# Patient Record
Sex: MALE | Race: WHITE | Employment: FULL TIME | URBAN - METROPOLITAN AREA
[De-identification: names, ages, dates, MRNs, and addresses within clinical notes are randomized per-mention and may not be internally consistent; named-entity substitution may affect disease eponyms.]

---

## 2017-04-10 ENCOUNTER — HOSPITAL ENCOUNTER (OUTPATIENT)
Dept: GENERAL RADIOLOGY | Age: 52
Discharge: HOME | End: 2017-04-10
Attending: SURGERY
Payer: SELF-PAY

## 2017-04-10 DIAGNOSIS — R10.10 PAIN OF UPPER ABDOMEN: ICD-10-CM

## 2017-04-10 DIAGNOSIS — K21.9 GASTROESOPHAGEAL REFLUX DISEASE, ESOPHAGITIS PRESENCE NOT SPECIFIED: ICD-10-CM

## 2017-04-10 PROCEDURE — 74241 XR UPPER GI SERIES W KUB: CPT

## 2017-04-10 PROCEDURE — 74011000250 HC RX REV CODE- 250: Performed by: SURGERY

## 2017-04-10 PROCEDURE — 74011000255 HC RX REV CODE- 255: Performed by: SURGERY

## 2017-04-10 RX ADMIN — ANTACID/ANTIFLATULENT 4 G: 380; 550; 10; 10 GRANULE, EFFERVESCENT ORAL at 09:05

## 2017-04-10 RX ADMIN — BARIUM SULFATE 60 ML: 980 POWDER, FOR SUSPENSION ORAL at 08:57

## 2017-04-10 RX ADMIN — BARIUM SULFATE 700 MG: 700 TABLET ORAL at 09:03

## 2017-04-10 RX ADMIN — BARIUM SULFATE 75 ML: 0.6 SUSPENSION ORAL at 08:59

## 2017-04-28 ENCOUNTER — ANESTHESIA EVENT (OUTPATIENT)
Dept: SURGERY | Age: 52
End: 2017-04-28
Payer: SELF-PAY

## 2017-05-05 ENCOUNTER — HOSPITAL ENCOUNTER (OUTPATIENT)
Dept: SURGERY | Age: 52
Discharge: HOME | End: 2017-05-05
Attending: SURGERY
Payer: SELF-PAY

## 2017-05-05 VITALS
BODY MASS INDEX: 42.69 KG/M2 | HEIGHT: 70 IN | RESPIRATION RATE: 18 BRPM | WEIGHT: 298.2 LBS | OXYGEN SATURATION: 98 % | TEMPERATURE: 96.9 F | HEART RATE: 50 BPM | DIASTOLIC BLOOD PRESSURE: 81 MMHG | SYSTOLIC BLOOD PRESSURE: 113 MMHG

## 2017-05-05 LAB
ALBUMIN SERPL BCP-MCNC: 3.9 G/DL (ref 3.5–5)
ALBUMIN/GLOB SERPL: 1.2 {RATIO} (ref 1.2–3.5)
ALP SERPL-CCNC: 71 U/L (ref 50–136)
ALT SERPL-CCNC: 47 U/L (ref 12–65)
ANION GAP BLD CALC-SCNC: 3 MMOL/L (ref 7–16)
AST SERPL W P-5'-P-CCNC: 24 U/L (ref 15–37)
BILIRUB SERPL-MCNC: 0.6 MG/DL (ref 0.2–1.1)
BUN SERPL-MCNC: 12 MG/DL (ref 6–23)
CALCIUM SERPL-MCNC: 9 MG/DL (ref 8.3–10.4)
CHLORIDE SERPL-SCNC: 108 MMOL/L (ref 98–107)
CO2 SERPL-SCNC: 32 MMOL/L (ref 21–32)
CREAT SERPL-MCNC: 0.98 MG/DL (ref 0.8–1.5)
ERYTHROCYTE [DISTWIDTH] IN BLOOD BY AUTOMATED COUNT: 14.5 % (ref 11.9–14.6)
GLOBULIN SER CALC-MCNC: 3.2 G/DL (ref 2.3–3.5)
GLUCOSE SERPL-MCNC: 111 MG/DL (ref 65–100)
HCT VFR BLD AUTO: 43.4 % (ref 41.1–50.3)
HGB BLD-MCNC: 14.5 G/DL (ref 13.6–17.2)
MCH RBC QN AUTO: 29.8 PG (ref 26.1–32.9)
MCHC RBC AUTO-ENTMCNC: 33.4 G/DL (ref 31.4–35)
MCV RBC AUTO: 89.1 FL (ref 79.6–97.8)
PLATELET # BLD AUTO: 183 K/UL (ref 150–450)
PMV BLD AUTO: 9.7 FL (ref 10.8–14.1)
POTASSIUM SERPL-SCNC: 4.5 MMOL/L (ref 3.5–5.1)
PROT SERPL-MCNC: 7.1 G/DL (ref 6.3–8.2)
RBC # BLD AUTO: 4.87 M/UL (ref 4.23–5.67)
SODIUM SERPL-SCNC: 143 MMOL/L (ref 136–145)
WBC # BLD AUTO: 6.8 K/UL (ref 4.3–11.1)

## 2017-05-05 PROCEDURE — 85027 COMPLETE CBC AUTOMATED: CPT | Performed by: SURGERY

## 2017-05-05 PROCEDURE — 80053 COMPREHEN METABOLIC PANEL: CPT | Performed by: SURGERY

## 2017-05-05 RX ORDER — FLUTICASONE PROPIONATE 50 MCG
2 SPRAY, SUSPENSION (ML) NASAL
COMMUNITY

## 2017-05-05 NOTE — PERIOP NOTES
Patient verified name, , and surgery as listed in [facility]. Requested clarification of order for consent: left voice message for Chelo Verde RN at Dr Filippo Olivares office. Type 2 surgery, PAT assessment complete. Labs per surgeon: cbc,cmp; results pending. Labs per anesthesia protocol: no other labs. EKG:  Not done today per anesthesia guidelines. Requested most recent ekg from PCP for reference if needed. Hibiclens and instructions given per hospital policy. Patient provided with handouts including Guide to Surgery, Pain Management, Hand Hygiene, Blood Transfusion Education, and [place] [place] [facility] Brochure. Patient answered medical/surgical history questions at their best of ability. All prior to admission medications documented in [facility]. Original medication prescription bottle not visualized during patient appointment. Patient instructed to hold all vitamins 7 days prior to surgery and NSAIDS 5 days prior to surgery, patient verbalized understanding. Medications to be held : none. Patient instructed to continue previous medications as prescribed prior to surgery and to take the following medications the day of surgery according to anesthesia guidelines with a small sip of water: none. Patient taught back and verbalized understanding.

## 2017-05-05 NOTE — PERIOP NOTES
Received telephone order( with read-back) from Rubia Escobar R.N for Dr Black Patino, surgeon:  Order for surgery consent is \"Laparoscopic Possible Open Removal Of Adjustable Gastric Band and Subcutaneous Port Components\". Discontinue order for [facility]".

## 2017-05-05 NOTE — PERIOP NOTES
Lab results are within limits per anesthesia protocol. Received stress test results dated 9-22-15: within anesthesia limits.

## 2017-05-08 ENCOUNTER — HOSPITAL ENCOUNTER (OUTPATIENT)
Age: 52
Setting detail: OUTPATIENT SURGERY
Discharge: HOME | End: 2017-05-08
Attending: SURGERY | Admitting: SURGERY
Payer: SELF-PAY

## 2017-05-08 ENCOUNTER — ANESTHESIA (OUTPATIENT)
Dept: SURGERY | Age: 52
End: 2017-05-08
Payer: SELF-PAY

## 2017-05-08 VITALS
DIASTOLIC BLOOD PRESSURE: 73 MMHG | HEART RATE: 73 BPM | SYSTOLIC BLOOD PRESSURE: 128 MMHG | BODY MASS INDEX: 42.69 KG/M2 | TEMPERATURE: 98 F | HEIGHT: 70 IN | WEIGHT: 298.19 LBS | RESPIRATION RATE: 18 BRPM | OXYGEN SATURATION: 95 %

## 2017-05-08 PROCEDURE — 77030008477 HC STYL SATN SLP COVD -A: Performed by: ANESTHESIOLOGY

## 2017-05-08 PROCEDURE — 77030000038 HC TIP SCIS LAPSCP SURI -B: Performed by: SURGERY

## 2017-05-08 PROCEDURE — 74011000258 HC RX REV CODE- 258: Performed by: SURGERY

## 2017-05-08 PROCEDURE — 74011250636 HC RX REV CODE- 250/636

## 2017-05-08 PROCEDURE — 76210000006 HC OR PH I REC 0.5 TO 1 HR: Performed by: SURGERY

## 2017-05-08 PROCEDURE — 77030035051: Performed by: SURGERY

## 2017-05-08 PROCEDURE — 74011000250 HC RX REV CODE- 250: Performed by: SURGERY

## 2017-05-08 PROCEDURE — 74011000250 HC RX REV CODE- 250: Performed by: ANESTHESIOLOGY

## 2017-05-08 PROCEDURE — 74011000250 HC RX REV CODE- 250

## 2017-05-08 PROCEDURE — 77030002933 HC SUT MCRYL J&J -A: Performed by: SURGERY

## 2017-05-08 PROCEDURE — 76010000162 HC OR TIME 1.5 TO 2 HR INTENSV-TIER 1: Performed by: SURGERY

## 2017-05-08 PROCEDURE — 77030019940 HC BLNKT HYPOTHRM STRY -B: Performed by: ANESTHESIOLOGY

## 2017-05-08 PROCEDURE — 77030011640 HC PAD GRND REM COVD -A: Performed by: SURGERY

## 2017-05-08 PROCEDURE — 74011250636 HC RX REV CODE- 250/636: Performed by: ANESTHESIOLOGY

## 2017-05-08 PROCEDURE — 77030010507 HC ADH SKN DERMBND J&J -B: Performed by: SURGERY

## 2017-05-08 PROCEDURE — 77030034154 HC SHR COAG HARM ACE J&J -F: Performed by: SURGERY

## 2017-05-08 PROCEDURE — 77030018836 HC SOL IRR NACL ICUM -A: Performed by: SURGERY

## 2017-05-08 PROCEDURE — 74011250636 HC RX REV CODE- 250/636: Performed by: SURGERY

## 2017-05-08 PROCEDURE — 88300 SURGICAL PATH GROSS: CPT | Performed by: SURGERY

## 2017-05-08 PROCEDURE — 77030002912 HC SUT ETHBND J&J -A: Performed by: SURGERY

## 2017-05-08 PROCEDURE — 77030008703 HC TU ET UNCUF COVD -A: Performed by: ANESTHESIOLOGY

## 2017-05-08 PROCEDURE — 76060000034 HC ANESTHESIA 1.5 TO 2 HR: Performed by: SURGERY

## 2017-05-08 PROCEDURE — 77030008518 HC TBNG INSUF ENDO STRY -B: Performed by: SURGERY

## 2017-05-08 PROCEDURE — 76210000020 HC REC RM PH II FIRST 0.5 HR: Performed by: SURGERY

## 2017-05-08 PROCEDURE — 77030033269 HC SLV COMPR SCD KNE2 CARD -B: Performed by: SURGERY

## 2017-05-08 PROCEDURE — 77030031139 HC SUT VCRL2 J&J -A: Performed by: SURGERY

## 2017-05-08 PROCEDURE — 77030034849: Performed by: SURGERY

## 2017-05-08 PROCEDURE — 77030035048 HC TRCR ENDOSC OPTCL COVD -B: Performed by: SURGERY

## 2017-05-08 PROCEDURE — 77030035045 HC TRCR ENDOSC VRSPRT BLDLSS COVD -B: Performed by: SURGERY

## 2017-05-08 RX ORDER — KETOROLAC TROMETHAMINE 30 MG/ML
INJECTION, SOLUTION INTRAMUSCULAR; INTRAVENOUS AS NEEDED
Status: DISCONTINUED | OUTPATIENT
Start: 2017-05-08 | End: 2017-05-08 | Stop reason: HOSPADM

## 2017-05-08 RX ORDER — GLYCOPYRROLATE 0.2 MG/ML
INJECTION INTRAMUSCULAR; INTRAVENOUS AS NEEDED
Status: DISCONTINUED | OUTPATIENT
Start: 2017-05-08 | End: 2017-05-08 | Stop reason: HOSPADM

## 2017-05-08 RX ORDER — LIDOCAINE HYDROCHLORIDE 10 MG/ML
0.1 INJECTION INFILTRATION; PERINEURAL AS NEEDED
Status: DISCONTINUED | OUTPATIENT
Start: 2017-05-08 | End: 2017-05-08 | Stop reason: HOSPADM

## 2017-05-08 RX ORDER — SUCCINYLCHOLINE CHLORIDE 20 MG/ML
INJECTION INTRAMUSCULAR; INTRAVENOUS AS NEEDED
Status: DISCONTINUED | OUTPATIENT
Start: 2017-05-08 | End: 2017-05-08 | Stop reason: HOSPADM

## 2017-05-08 RX ORDER — MIDAZOLAM HYDROCHLORIDE 1 MG/ML
2 INJECTION, SOLUTION INTRAMUSCULAR; INTRAVENOUS ONCE
Status: COMPLETED | OUTPATIENT
Start: 2017-05-08 | End: 2017-05-08

## 2017-05-08 RX ORDER — ONDANSETRON 2 MG/ML
4 INJECTION INTRAMUSCULAR; INTRAVENOUS ONCE
Status: DISCONTINUED | OUTPATIENT
Start: 2017-05-08 | End: 2017-05-08 | Stop reason: HOSPADM

## 2017-05-08 RX ORDER — ONDANSETRON 2 MG/ML
INJECTION INTRAMUSCULAR; INTRAVENOUS AS NEEDED
Status: DISCONTINUED | OUTPATIENT
Start: 2017-05-08 | End: 2017-05-08 | Stop reason: HOSPADM

## 2017-05-08 RX ORDER — ENOXAPARIN SODIUM 100 MG/ML
40 INJECTION SUBCUTANEOUS ONCE
Status: COMPLETED | OUTPATIENT
Start: 2017-05-08 | End: 2017-05-08

## 2017-05-08 RX ORDER — SODIUM CHLORIDE, SODIUM LACTATE, POTASSIUM CHLORIDE, CALCIUM CHLORIDE 600; 310; 30; 20 MG/100ML; MG/100ML; MG/100ML; MG/100ML
75 INJECTION, SOLUTION INTRAVENOUS CONTINUOUS
Status: DISCONTINUED | OUTPATIENT
Start: 2017-05-08 | End: 2017-05-08 | Stop reason: HOSPADM

## 2017-05-08 RX ORDER — NALOXONE HYDROCHLORIDE 0.4 MG/ML
0.2 INJECTION, SOLUTION INTRAMUSCULAR; INTRAVENOUS; SUBCUTANEOUS AS NEEDED
Status: DISCONTINUED | OUTPATIENT
Start: 2017-05-08 | End: 2017-05-08 | Stop reason: HOSPADM

## 2017-05-08 RX ORDER — ROCURONIUM BROMIDE 10 MG/ML
INJECTION, SOLUTION INTRAVENOUS AS NEEDED
Status: DISCONTINUED | OUTPATIENT
Start: 2017-05-08 | End: 2017-05-08 | Stop reason: HOSPADM

## 2017-05-08 RX ORDER — MIDAZOLAM HYDROCHLORIDE 1 MG/ML
2 INJECTION, SOLUTION INTRAMUSCULAR; INTRAVENOUS
Status: DISCONTINUED | OUTPATIENT
Start: 2017-05-08 | End: 2017-05-08 | Stop reason: HOSPADM

## 2017-05-08 RX ORDER — HYDROMORPHONE HYDROCHLORIDE 2 MG/ML
0.5 INJECTION, SOLUTION INTRAMUSCULAR; INTRAVENOUS; SUBCUTANEOUS
Status: DISCONTINUED | OUTPATIENT
Start: 2017-05-08 | End: 2017-05-08 | Stop reason: HOSPADM

## 2017-05-08 RX ORDER — DEXAMETHASONE SODIUM PHOSPHATE 100 MG/10ML
INJECTION INTRAMUSCULAR; INTRAVENOUS AS NEEDED
Status: DISCONTINUED | OUTPATIENT
Start: 2017-05-08 | End: 2017-05-08 | Stop reason: HOSPADM

## 2017-05-08 RX ORDER — OXYCODONE HYDROCHLORIDE 5 MG/1
5 TABLET ORAL
Status: DISCONTINUED | OUTPATIENT
Start: 2017-05-08 | End: 2017-05-08 | Stop reason: HOSPADM

## 2017-05-08 RX ORDER — NEOSTIGMINE METHYLSULFATE 1 MG/ML
INJECTION INTRAVENOUS AS NEEDED
Status: DISCONTINUED | OUTPATIENT
Start: 2017-05-08 | End: 2017-05-08 | Stop reason: HOSPADM

## 2017-05-08 RX ORDER — LIDOCAINE HYDROCHLORIDE 20 MG/ML
INJECTION, SOLUTION EPIDURAL; INFILTRATION; INTRACAUDAL; PERINEURAL AS NEEDED
Status: DISCONTINUED | OUTPATIENT
Start: 2017-05-08 | End: 2017-05-08 | Stop reason: HOSPADM

## 2017-05-08 RX ORDER — PROPOFOL 10 MG/ML
INJECTION, EMULSION INTRAVENOUS AS NEEDED
Status: DISCONTINUED | OUTPATIENT
Start: 2017-05-08 | End: 2017-05-08 | Stop reason: HOSPADM

## 2017-05-08 RX ORDER — FENTANYL CITRATE 50 UG/ML
INJECTION, SOLUTION INTRAMUSCULAR; INTRAVENOUS AS NEEDED
Status: DISCONTINUED | OUTPATIENT
Start: 2017-05-08 | End: 2017-05-08 | Stop reason: HOSPADM

## 2017-05-08 RX ADMIN — GLYCOPYRROLATE 0.4 MG: 0.2 INJECTION INTRAMUSCULAR; INTRAVENOUS at 12:03

## 2017-05-08 RX ADMIN — GENTAMICIN SULFATE 490 MG: 40 INJECTION, SOLUTION INTRAMUSCULAR; INTRAVENOUS at 09:41

## 2017-05-08 RX ADMIN — SODIUM CHLORIDE 900 MG: 900 INJECTION, SOLUTION INTRAVENOUS at 10:42

## 2017-05-08 RX ADMIN — LIDOCAINE HYDROCHLORIDE 80 MG: 20 INJECTION, SOLUTION EPIDURAL; INFILTRATION; INTRACAUDAL; PERINEURAL at 10:46

## 2017-05-08 RX ADMIN — ENOXAPARIN SODIUM 40 MG: 40 INJECTION SUBCUTANEOUS at 09:42

## 2017-05-08 RX ADMIN — NEOSTIGMINE METHYLSULFATE 3 MG: 1 INJECTION INTRAVENOUS at 12:03

## 2017-05-08 RX ADMIN — LIDOCAINE HYDROCHLORIDE 0.1 ML: 10 INJECTION, SOLUTION INFILTRATION; PERINEURAL at 09:38

## 2017-05-08 RX ADMIN — DEXAMETHASONE SODIUM PHOSPHATE 10 MG: 100 INJECTION INTRAMUSCULAR; INTRAVENOUS at 11:01

## 2017-05-08 RX ADMIN — SODIUM CHLORIDE, SODIUM LACTATE, POTASSIUM CHLORIDE, AND CALCIUM CHLORIDE: 600; 310; 30; 20 INJECTION, SOLUTION INTRAVENOUS at 11:21

## 2017-05-08 RX ADMIN — MIDAZOLAM HYDROCHLORIDE 2 MG: 1 INJECTION, SOLUTION INTRAMUSCULAR; INTRAVENOUS at 09:55

## 2017-05-08 RX ADMIN — FENTANYL CITRATE 100 MCG: 50 INJECTION, SOLUTION INTRAMUSCULAR; INTRAVENOUS at 10:46

## 2017-05-08 RX ADMIN — ROCURONIUM BROMIDE 5 MG: 10 INJECTION, SOLUTION INTRAVENOUS at 10:46

## 2017-05-08 RX ADMIN — ROCURONIUM BROMIDE 30 MG: 10 INJECTION, SOLUTION INTRAVENOUS at 10:55

## 2017-05-08 RX ADMIN — SODIUM CHLORIDE 600 MG: 900 INJECTION, SOLUTION INTRAVENOUS at 12:11

## 2017-05-08 RX ADMIN — SODIUM CHLORIDE, SODIUM LACTATE, POTASSIUM CHLORIDE, AND CALCIUM CHLORIDE 75 ML/HR: 600; 310; 30; 20 INJECTION, SOLUTION INTRAVENOUS at 09:38

## 2017-05-08 RX ADMIN — PROPOFOL 200 MG: 10 INJECTION, EMULSION INTRAVENOUS at 10:46

## 2017-05-08 RX ADMIN — ROCURONIUM BROMIDE 5 MG: 10 INJECTION, SOLUTION INTRAVENOUS at 11:53

## 2017-05-08 RX ADMIN — KETOROLAC TROMETHAMINE 30 MG: 30 INJECTION, SOLUTION INTRAMUSCULAR; INTRAVENOUS at 11:56

## 2017-05-08 RX ADMIN — SUCCINYLCHOLINE CHLORIDE 180 MG: 20 INJECTION INTRAMUSCULAR; INTRAVENOUS at 10:46

## 2017-05-08 RX ADMIN — ONDANSETRON 4 MG: 2 INJECTION INTRAMUSCULAR; INTRAVENOUS at 11:56

## 2017-05-08 NOTE — OP NOTES
[facility] 64   OPERATIVE REPORT       Name:  Linda Figueroa   MR#:  [id number]   :  1965   Account #:  [de-identified]   Date of Adm:  2017       PREOPERATIVE DIAGNOSIS: Epigastric pain, chest pain, and   dysphagia after a history of an adjustable gastric band   placement. POSTOPERATIVE DIAGNOSIS: Epigastric pain, chest pain, and   dysphagia after a history of an adjustable gastric band   placement with intraabdominal adhesions found. NAME OF PROCEDURE: Some laparoscopic removal of adjustable   gastric band and lysis of adhesions. SURGEON: Author Kyler MD.     ANESTHESIA: General endotracheal anesthesia with Dr. Gladis Young and   Collette Taylor, the CRNA.    ESTIMATED BLOOD LOSS: 25 mL. SPECIMENS: Gastric band tubing and port were sent for gross   examination to pathology. HISTORY: This is a 78-year-old gentleman who came to my office   for consideration of epigastric pain, chest pain, dysphasia. He   had an adjustable gastric band placed [place] in . Unfortunately, the surgeon who placed the band no longer had   bariatric privileges and could not remove the band. He went to   another program in [place] and was not happy, therefore, he   came to our program. I ordered an upper GI series that showed   delayed emptying through the band, even though there was no   fluid in it. The 12.5 mm tablet did not pass through. There was   significant reflux. The patient has been uncomfortable for   sometime awaiting removal of this band. I recommended excision   of the band. I went through the risks of bleeding, infection,   anesthesia, injury to the esophagus, stomach, small bowel, large   bowel, liver, spleen, any of the surrounding organs in the upper   part of the abdomen. I said we would remove the band, tubing,   and port. He was agreeable and wanted to proceed. The surgery   was scheduled for today.     DESCRIPTION OF PROCEDURE: The patient was brought to room #7 at   [facility], placed on the operating table in   the supine position where general endotracheal anesthesia was   administered without complication. He had a Lagos catheter   placed. This was removed and the packing. Sequential compression   devices were placed and used throughout the procedure. The   abdomen was prepped and draped in the usual sterile manner. The   surgeon and [place] to place the port. An incision site   superior and to the patient's left of the umbilicus and made an   incision through this. Previous incision with a 15 scalpel blade   got down to the port, opened the capsule, and cut the Prolene   sutures that were causing adherence of the port to the   underlying fascia. Once we had done this, we pulled up on the   tubing until we got to the junction where it had been joined to   the previous tubing. I cut the tubing at this point,   hemostated the tubing. Using an Optiview trocar were then able   to enter the peritoneal cavity. The peritoneal cavity was   insufflated to 15 mmHg using carbon dioxide gas after which a 10   mm 30 degree scope was inserted. The patient had incisions,   two 5 mm trocars incisions, one in the epigastric region, one in   the right upper quadrant that had been used by the previous   surgeon in [place]. We placed a 5 mm trocar in the left upper   quadrant through one of the previous incision sites. He had   adhesions between where the 5 mm trocar site was in the anterior   abdominal wall and the omentum. He also had significant   adhesions where the tubing went up into the port. I enlarged the   5 mm trocar in the left upper quadrant to a 12 mm trocar, placed   a camera through this. Using the laparoscopic chiquita and the   Harmonic scalpel, we spent about 20 minutes taking down   adhesions around the port site. Once this was done, we   reinserted the 10 mm 30-degree scope into the Optiview trocar   and took on the band itself.  The band tubing came out on the   greater curvature side of the stomach unlike the way I normally   do it. We were able to cut down significant adhesions   surrounding the band and the tubing and then came upon the   locking mechanism for the Lap-Band system itself. We divided all   the adhesions overlying this, I was able to open the band and   then was able to pull it through its tract. I divided all the   capsule on the anterior 180 degrees of the previous Lap-Band   site. This was done with the 5 mm Harmonic scalpel. The band was   brought up through the Optiview trocar site and sent to   pathology in 3 pieces. There was another segment of tubing and   then there was the lap band itself. This was passed off to   pathology for gross examination only. The Optiview trocar was   turned to the previous supraumbilical incision site. We checked   the area where the band had been. There was no significant   bleeding. We removed the trocars under direct vision. There was   bleeding from 2 of the trocars which were cauterized with the   hook and the electrocautery. The other 2 trocars had no evidence   of bleeding. Once There was no bleeding noted from the trocar   sites. The Optiview trocar was removed. I closed the fascia of   the old port site with a running 0 Vicryl suture. The port sites   were irrigated. Subcutaneous tissues were free of any bleeding   and we closed with a subcuticular suture of 4-0 Monocryl. I   injected 30 mL of 0.5% Marcaine in divided doses at the 5   incision sites. The patient tolerated the procedure well, was   extubated, brought to recovery room in stable condition.         Fahad Carlson MD      [facility] / [facility] Landeros   D:  05/08/2017   12:19   T:  05/08/2017   14:59   Job #:  [id number]

## 2017-05-08 NOTE — INTERVAL H&P NOTE
H&P Update:  Woo Quintero was seen and examined. History and physical has been reviewed. The patient has been examined.  There have been no significant clinical changes since the completion of the originally dated History and Physical.    Signed By: Janie Delgadillo MD     May 8, 2017 8:45 AM

## 2017-05-08 NOTE — H&P (VIEW-ONLY)
History and Physical    Patient: Liyah No MRN: [id number]  SSN: [id number]    YOB: 1965  Age: 47 y.o. Sex: male              PCP: None   Date:  4/24/2017        Liyah No  who presents to the [facility] for consultation for removal of his adjustable gastric band. Mr. Sincere Quintero had a LapBand placed 8/18/2009 by Dr. Angel Castillo in [place], [place]. He returned to Dr. Anton Mittal ([facility]) on 3/20/15 with complaints of epigastric pain and dysphagia with liquids and solids. At that visit, Dr. Anton Mittal removed all of the fluid from his band and ordered an UGI series. The patient returned on 3/25/15 for the results and reported that his epigastric pain was still present but had improved. The UGI at that time showed the band in good position and that liquids passed through it, however the barium tablet did not. He and Dr. Anton Mittal discussed removing his band at that time, however Dr. Anton Mittal no longer had surgical privileges for bariatrics and referred the patient to another program for AGB removal. The patient was unsatisfied with that particular program and consequently did not proceed with the surgery. He contacted our program recently to request a consult to discuss the removal of his band. Today he states that he has been having significant epigastric pain that radiates into his \"left rib cage\" and dysphagia. Pt states that this symptoms intensified in January and he went for an EKG to rule out cardiac problems. EKG was negative and he realized that it must be his AGB. Prior to coming in today, pt had a UGI series on 3/21/17 which showed as follows: FINDINGS: The patient aspirated a small amount of barium at least once. There  is no fold thickening or ulceration in the esophagus. There is mild diffuse  spasm.     There is a lap band in place at the GE junction. There is normal luminal  narrowing. The esophagus above the lap band is not significantly distended.  No  fold thickening is seen in the stomach. There is no ulceration. There is  prompt emptying into the small bowel. The patient was given a barium tablet  which did become lodged at the lap band.     IMPRESSION  IMPRESSION:   1. Small amount of aspiration. 2. Mild esophageal spasm. 3. Lap band in appropriate position. Today he presents with a height of 5' 10\" (1.778 m) and weight of 297 lb (134.7 kg), giving him a Body mass index is 42.62 kg/(m^2). Milvia Post We have reviewed the procedure today. His questions were answered and he is ready to schedule surgery. We have discussed the procedure, diet and exercise regimens, and potential complications of surgery. The patient understands the nature and potential complication of surgery and has the capacity to follow the post operative diet, exercise, and nutritional requirements. HISTORY:  Past Medical History:   Diagnosis Date    Calculus of kidney     Contact dermatitis and other eczema, due to unspecified cause     Morbid obesity ([address]     Musculoskeletal disorder        He denies personal or family history of problems with anesthesia, bleeding, or clotting. He denies history of DVT or pulmonary embolism. He denies reflux or dysphagia. Past Surgical History:   Procedure Laterality Date    HX APPENDECTOMY      HX COLONOSCOPY  2013    HX HEENT      uvulectomt,t&A    HX ORTHOPAEDIC      neck , spinal fusion,rt knee artho    HX OTHER SURGICAL      lap band     Social History   Substance Use Topics    Smoking status: Never Smoker    Smokeless tobacco: Not on file    Alcohol use Yes     No family history on file. MEDICATIONS:  No current outpatient prescriptions on file. No current facility-administered medications for this visit. ALLERGIES:  Allergies   Allergen Reactions    Penicillins Rash     Doesn't exactly remember, had it as a child       ROS: The patient has no difficulty with chest pain or shortness of breath.   No fever or chills. Comprehensive 13 point review of systems was otherwise unremarkable except as noted above. PHYSICAL EXAM:   Visit Vitals    BP (!) 142/94    Pulse (!) 59    Ht 5' 10\" (1.778 m)    Wt 297 lb (134.7 kg)    BMI 42.62 kg/m2       General: Alert oriented cooperative patient in no acute distress. Eyes: Sclera are clear. Conjunctiva and lids within normal limits. No icterus. Ears and Nose: no gross deformities to visual inspection, gross hearing intact  Neck: Supple, trachea midline, no appreciable thyromegaly  Resp: No JVD. Breathing is  non-labored. Lungs clear to auscultation without wheezing or rhonchi   CV: RRR. No murmurs, rubs or gallops appreciated, Carotid bruits are absent  Abd: soft non-tender and non-distended without peritoneal signs. +bs    Psych:  Mood and affect appropriate. Short-term memory and understanding intact      ASSESSMENT:  Morbid obesity with a  Body mass index is 42.62 kg/(m^2). ready for lap/open removal of his adjustable gastric band. PLAN:  1.  Schedule for laparoscopic, possible open, AGB removal, in the near future. 2. Follow up after surgery. I went over the risks and benefits with the patient. For risks I went over problems with bleeding, infection and anesthesia. I also noted potential problems of injury to the esophagus, liver, spleen, stomach, pancreas, small bowel and or colon. I addition, I discussed potential problems of DVT/pulmonary embolism, urinary tract infection, wound infection, myocardial infarction, stroke and the potential need to convert to an open procedure. I quoted a 1% nationwide mortality rate for this procedure.     Mariam Gilmore MD    Date:  4/24/2017

## 2017-05-08 NOTE — PERIOP NOTES
Patient's O2 sat 83% after Versed 2mg given IV. O2 per nasal cannula applied to patient.   O2sat 100%

## 2017-05-08 NOTE — PERIOP NOTES
Discharge instructions reviewed with patient and family  Hard copy signed and placed on the chart    Verbalized understanding  And prescription given

## 2017-05-08 NOTE — DISCHARGE INSTRUCTIONS
1. Diet as tolerated except for a  low fat diet after laparoscopic cholecystectomy. 2. Showering is allowed, but no tub baths, hot tubs or swimming. 3. Drainage is common from the wounds. Change the dressings as needed. Call our office if the wounds become reddened, tender, feel warm to the touch or pus starts to drain from the wound. 4. Take prescribed pain medication as directed, usually Percocet, Norco, Ultram or Dilaudid. Take over the counter medication for minor pain. 5. Ice may be applied intermittently to the surgical site or sites. 6. Call or office, [phone], if problems arise. 7. Follow up in the office at the assigned time. 8. Resume all medications as taken per surgery, unless specifically instructed not to take certain ones. 9. No lifting more than 25 pounds until told otherwise. 10. Driving is allowed 3 days after surgery as long as you feel comfortable enough to drive and have not taken any prescription pain medication prior to driving.

## 2017-05-08 NOTE — PERIOP NOTES
Patient's nephew Colin Elizabeth to  patient at discharge. Call when patient gets to recovery room [phone]    Patient's O2 sat 83% after Versed 2mg given IV. O2 per nasal cannula applied to patient at 3liters per nsal cannula.

## 2017-05-08 NOTE — BRIEF OP NOTE
BRIEF OPERATIVE NOTE    Date of Procedure: 5/8/2017   Preoperative Diagnosis: EPIGASTRIC PAIN, CHEST PAIN AND DYSPHAGIA AFTER ADJUSTABLE GASTRIC BAND PLACEMENT  Postoperative Diagnosis: SAME AS WELL AS INTRAABDOMINAL ADHESIONS.    Procedure(s): LAPAROSCOPIC GASTRIC BAND REMOVAL AND LYSIS OF ADHESIONS  Surgeon(s) and Role:     * Vitaly Galindo MD - Primary         Assistant Staff:       Surgical Staff:  Circ-1: Jarett Lemon RN  Circ-2: Richard Alvarado RN  Scrub Tech-1: Ayad Powell  Scrub Tech-2: Ophelia Snow Ma  Scrub Tech-Relief: Noemy Menezes  Event Time In   Incision Start 1103   Incision Close 1202     Anesthesia: General plus local  Estimated Blood Loss: 25 cc's  Specimens:   ID Type Source Tests Collected by Time Destination   1 : Gastric Lap Band Fresh Abdomen  Vitaly Galindo MD 5/8/2017 1140 Pathology      Findings: See dictated note  Complications: None  Implants: * No implants in log *

## 2017-05-08 NOTE — IP AVS SNAPSHOT
Jessica Dilanvivian 
 
 
 [address] 
[phone] Patient: Willaim Nissen MRN: [id number] :1965 You are allergic to the following Allergen Reactions Penicillins Rash Doesn't exactly remember, had it as a child Recent Documentation Height Weight BMI Smoking Status 1.778 m 135.3 kg 42.79 kg/m2 Never Smoker Emergency Contacts Name Discharge Info Relation Home Work Mobile Scott Hancock DISCHARGE CAREGIVER [3] Sister [23]   [phone] About your hospitalization You were admitted on: May 8, 2017 You last received care in the:  [facility] PACU You were discharged on: May 8, 2017 Unit phone number:  [phone] Why you were hospitalized Your primary diagnosis was:  Not on File Providers Seen During Your Hospitalizations Provider Role Specialty Primary office phone Norm Millan MD Attending Provider General Surgery [phone] Your Primary Care Physician (PCP) Primary Care Physician Office Phone Office Fax Andree Thomson 29[address], [address] [facility] [phone] Follow-up Information Follow up With Details Comments Contact Info Gogo Manuel MD   [address] [place] [id number] [phone] Norm Millan MD Schedule an appointment as soon as possible for a visit in 2 week(s)  [address] [place] [address] [id number] [phone] Your Appointments Monday May 22, 2017  1:40 PM EDT  
GPO BAR with Norm Millan MD  
[place] [facility] ([address] [place] [address]) [address] [place] [phone] Current Discharge Medication List  
  
CONTINUE these medications which have NOT CHANGED Dose & Instructions Dispensing Information Comments Morning Noon Evening Bedtime FLONASE 50 mcg/actuation nasal spray Generic drug:  fluticasone Your last dose was: Your next dose is:    
   
   
 Dose:  2 Spray 2 Sprays by Both Nostrils route nightly. Indications: ALLERGIC RHINITIS Refills:  0 Discharge Instructions 1. Diet as tolerated except for a  low fat diet after laparoscopic cholecystectomy. 2. Showering is allowed, but no tub baths, hot tubs or swimming. 3. Drainage is common from the wounds. Change the dressings as needed. Call our office if the wounds become reddened, tender, feel warm to the touch or pus starts to drain from the wound. 4. Take prescribed pain medication as directed, usually Percocet, Norco, Ultram or Dilaudid. Take over the counter medication for minor pain. 5. Ice may be applied intermittently to the surgical site or sites. 6. Call or office, [phone], if problems arise. 7. Follow up in the office at the assigned time. 8. Resume all medications as taken per surgery, unless specifically instructed not to take certain ones. 9. No lifting more than 25 pounds until told otherwise. 10. Driving is allowed 3 days after surgery as long as you feel comfortable enough to drive and have not taken any prescription pain medication prior to driving. Discharge Orders None Introducing [facility]! Raffaele Carrillo introduces [employer] patient portal. Now you can access parts of your medical record, email your doctor's office, and request medication refills online. 1. In your internet browser, go to [email or web address] 2. Click on the First Time User? Click Here link in the Sign In box. You will see the New Member Sign Up page. 3. Enter your [employer] Access Code exactly as it appears below. You will not need to use this code after youve completed the sign-up process. If you do not sign up before the expiration date, you must request a new code. · [employer] Access Code: [email or web address] Expires: 6/19/2017  5:15 PM 
 
4. Enter the last four digits of your Social Security Number (xxxx) and Date of Birth (mm/dd/yyyy) as indicated and click Submit. You will be taken to the next sign-up page. 5. Create a [employer] ID. This will be your [employer] login ID and cannot be changed, so think of one that is secure and easy to remember. 6. Create a [employer] password. You can change your password at any time. 7. Enter your Password Reset Question and Answer. This can be used at a later time if you forget your password. 8. Enter your e-mail address. You will receive e-mail notification when new information is available in [address] 9. Click Sign Up. You can now view and download portions of your medical record. 10. Click the Download Summary menu link to download a portable copy of your medical information. If you have questions, please visit the Frequently Asked Questions section of the [employer] website. Remember, [employer] is NOT to be used for urgent needs. For medical emergencies, dial 911. Now available from your iPhone and Android! General Information Please provide this summary of care documentation to your next provider. Patient Signature:  ____________________________________________________________ Date:  ____________________________________________________________  
  
Karli Spears Provider Signature:  ____________________________________________________________ Date:  ____________________________________________________________

## 2017-05-08 NOTE — ANESTHESIA POSTPROCEDURE EVALUATION
Post-Anesthesia Evaluation and Assessment    Patient: Woo Quintero MRN: [id number]  SSN: [id number]    YOB: 1965  Age: 47 y.o. Sex: male       Cardiovascular Function/Vital Signs  Visit Vitals    /86 (BP 1 Location: Right arm, BP Patient Position: At rest;Head of bed elevated (Comment degrees))    Pulse 90    Temp 36.7 °C (98 °F)    Resp 16    Ht 5' 10\" (1.778 m)    Wt 135.3 kg (298 lb 3 oz)    SpO2 96%    BMI 42.79 kg/m2       Patient is status post general anesthesia for Procedure(s):  GASTRIC BANDING REMOVAL LAPAROSCOPIC. Nausea/Vomiting: None    Postoperative hydration reviewed and adequate. Pain:  Pain Scale 1: Numeric (0 - 10) (05/08/17 1227)  Pain Intensity 1: 0 (05/08/17 1227)   Managed    Neurological Status:   Neuro (WDL): Exceptions to WDL (05/08/17 1217)  Neuro  Neurologic State: Drowsy; Eyes open to voice (05/08/17 1217)  Cognition: Decreased command following (05/08/17 1217)  Speech: Clear (05/08/17 1217)  LUE Motor Response: Purposeful (05/08/17 1217)  LLE Motor Response: Purposeful (05/08/17 1217)  RUE Motor Response: Purposeful (05/08/17 1217)  RLE Motor Response: Purposeful (05/08/17 1217)   At baseline    Mental Status and Level of Consciousness: Arousable    Pulmonary Status:   O2 Device: Nasal cannula (05/08/17 1227)   Adequate oxygenation and airway patent    Complications related to anesthesia: None    Post-anesthesia assessment completed. No concerns. Pt doing well.      Signed By: Camron Moreno MD     May 8, 2017

## 2017-05-11 ENCOUNTER — ANESTHESIA EVENT (OUTPATIENT)
Dept: SURGERY | Age: 52
DRG: 329 | End: 2017-05-11
Payer: SELF-PAY

## 2017-05-11 ENCOUNTER — APPOINTMENT (OUTPATIENT)
Dept: GENERAL RADIOLOGY | Age: 52
DRG: 329 | End: 2017-05-11
Attending: INTERNAL MEDICINE
Payer: SELF-PAY

## 2017-05-11 ENCOUNTER — HOSPITAL ENCOUNTER (INPATIENT)
Age: 52
LOS: 4 days | Discharge: HOME | DRG: 329 | End: 2017-05-15
Attending: SURGERY | Admitting: SURGERY
Payer: SELF-PAY

## 2017-05-11 ENCOUNTER — ANESTHESIA (OUTPATIENT)
Dept: SURGERY | Age: 52
DRG: 329 | End: 2017-05-11
Payer: SELF-PAY

## 2017-05-11 DIAGNOSIS — K56.60 INTESTINAL OBSTRUCTION, UNSPECIFIED TYPE: ICD-10-CM

## 2017-05-11 DIAGNOSIS — Z99.89 OSA ON CPAP: ICD-10-CM

## 2017-05-11 DIAGNOSIS — R10.84 GENERALIZED ABDOMINAL PAIN: ICD-10-CM

## 2017-05-11 DIAGNOSIS — G47.33 OSA ON CPAP: ICD-10-CM

## 2017-05-11 DIAGNOSIS — Z99.11 ENCOUNTER FOR WEANING FROM VENTILATOR (HCC): ICD-10-CM

## 2017-05-11 DIAGNOSIS — E66.01 MORBID OBESITY DUE TO EXCESS CALORIES (HCC): ICD-10-CM

## 2017-05-11 DIAGNOSIS — L25.9 CONTACT DERMATITIS AND OTHER ECZEMA, DUE TO UNSPECIFIED CAUSE: ICD-10-CM

## 2017-05-11 DIAGNOSIS — K56.609 SBO (SMALL BOWEL OBSTRUCTION) (HCC): Primary | ICD-10-CM

## 2017-05-11 DIAGNOSIS — K63.1 SMALL BOWEL PERFORATION (HCC): ICD-10-CM

## 2017-05-11 DIAGNOSIS — R11.15 INTRACTABLE CYCLICAL VOMITING WITH NAUSEA: ICD-10-CM

## 2017-05-11 LAB
ALBUMIN SERPL BCP-MCNC: 2.9 G/DL (ref 3.5–5)
ALBUMIN/GLOB SERPL: 0.6 {RATIO} (ref 1.2–3.5)
ALP SERPL-CCNC: 56 U/L (ref 50–136)
ALT SERPL-CCNC: 35 U/L (ref 12–65)
ANION GAP BLD CALC-SCNC: 10 MMOL/L (ref 7–16)
ARTERIAL PATENCY WRIST A: ABNORMAL
ARTERIAL PATENCY WRIST A: POSITIVE
AST SERPL W P-5'-P-CCNC: 19 U/L (ref 15–37)
BACTERIA SPEC CULT: NORMAL
BASE EXCESS BLDA CALC-SCNC: 0.4 MMOL/L (ref 0–3)
BASE EXCESS BLDA CALC-SCNC: 2.6 MMOL/L (ref 0–3)
BDY SITE: ABNORMAL
BDY SITE: ABNORMAL
BILIRUB SERPL-MCNC: 0.8 MG/DL (ref 0.2–1.1)
BUN SERPL-MCNC: 26 MG/DL (ref 6–23)
CA-I BLD-SCNC: 1.18 MMOL/L (ref 1–1.3)
CALCIUM SERPL-MCNC: 9.4 MG/DL (ref 8.3–10.4)
CHLORIDE BLDA-SCNC: 99 MMOL/L (ref 98–106)
CHLORIDE SERPL-SCNC: 101 MMOL/L (ref 98–107)
CO2 SERPL-SCNC: 27 MMOL/L (ref 21–32)
COHGB MFR BLD: 0.9 % (ref 0.5–1.5)
COHGB MFR BLD: 0.9 % (ref 0.5–1.5)
CREAT SERPL-MCNC: 1.13 MG/DL (ref 0.8–1.5)
DO-HGB BLD-MCNC: 4 % (ref 0–5)
DO-HGB BLD-MCNC: 9 % (ref 0–5)
ERYTHROCYTE [DISTWIDTH] IN BLOOD BY AUTOMATED COUNT: 14.3 % (ref 11.9–14.6)
FIO2 ON VENT: 21 %
GLOBULIN SER CALC-MCNC: 4.5 G/DL (ref 2.3–3.5)
GLUCOSE SERPL-MCNC: 166 MG/DL (ref 65–100)
HCO3 BLDA-SCNC: 26 MMOL/L (ref 22–26)
HCO3 BLDA-SCNC: 26 MMOL/L (ref 22–26)
HCT VFR BLD AUTO: 45.5 % (ref 41.1–50.3)
HGB BLD-MCNC: 15.8 G/DL (ref 13.6–17.2)
HGB BLDMV-MCNC: 15.3 GM/DL (ref 11.7–15)
HGB BLDMV-MCNC: 16.9 GM/DL (ref 11.7–15)
LACTATE SERPL-SCNC: 1.5 MMOL/L (ref 0.4–2)
MCH RBC QN AUTO: 29.9 PG (ref 26.1–32.9)
MCHC RBC AUTO-ENTMCNC: 34.7 G/DL (ref 31.4–35)
MCV RBC AUTO: 86.2 FL (ref 79.6–97.8)
METHGB MFR BLD: 0.7 % (ref 0–1.5)
METHGB MFR BLD: 0.9 % (ref 0–1.5)
OXYHGB MFR BLDA: 89.7 % (ref 94–97)
OXYHGB MFR BLDA: 94.3 % (ref 94–97)
PCO2 BLDA: 35 MMHG (ref 35–45)
PCO2 BLDA: 43 MMHG (ref 35–45)
PEEP RESPIRATORY: 8 CM[H2O]
PH BLDA: 7.39 [PH] (ref 7.35–7.45)
PH BLDA: 7.48 [PH] (ref 7.35–7.45)
PLATELET # BLD AUTO: 214 K/UL (ref 150–450)
PMV BLD AUTO: 9.5 FL (ref 10.8–14.1)
PO2 BLDA: 59 MMHG (ref 75–100)
PO2 BLDA: 81 MMHG (ref 75–100)
POTASSIUM BLDA-SCNC: 4.16 MMOL/L (ref 3.5–5.3)
POTASSIUM SERPL-SCNC: 4.1 MMOL/L (ref 3.5–5.1)
PROT SERPL-MCNC: 7.4 G/DL (ref 6.3–8.2)
RBC # BLD AUTO: 5.28 M/UL (ref 4.23–5.67)
SAO2 % BLD: 91 % (ref 92–98.5)
SAO2 % BLD: 96 % (ref 92–98.5)
SERVICE CMNT-IMP: ABNORMAL
SERVICE CMNT-IMP: NORMAL
SODIUM BLDA-SCNC: 133.2 MMOL/L (ref 135–148)
SODIUM SERPL-SCNC: 138 MMOL/L (ref 136–145)
VENTILATION MODE VENT: ABNORMAL
VENTILATION MODE VENT: ABNORMAL
WBC # BLD AUTO: 5.9 K/UL (ref 4.3–11.1)

## 2017-05-11 PROCEDURE — 77030031139 HC SUT VCRL2 J&J -A: Performed by: SURGERY

## 2017-05-11 PROCEDURE — 77030008771 HC TU NG SALEM SUMP -A: Performed by: SURGERY

## 2017-05-11 PROCEDURE — 77030020407 HC IV BLD WRMR ST 3M -A: Performed by: ANESTHESIOLOGY

## 2017-05-11 PROCEDURE — C9113 INJ PANTOPRAZOLE SODIUM, VIA: HCPCS | Performed by: SURGERY

## 2017-05-11 PROCEDURE — 76060000034 HC ANESTHESIA 1.5 TO 2 HR: Performed by: SURGERY

## 2017-05-11 PROCEDURE — 77030002996 HC SUT SLK J&J -A: Performed by: SURGERY

## 2017-05-11 PROCEDURE — 74011000250 HC RX REV CODE- 250: Performed by: SURGERY

## 2017-05-11 PROCEDURE — 88307 TISSUE EXAM BY PATHOLOGIST: CPT | Performed by: SURGERY

## 2017-05-11 PROCEDURE — 5A1945Z RESPIRATORY VENTILATION, 24-96 CONSECUTIVE HOURS: ICD-10-PCS | Performed by: SURGERY

## 2017-05-11 PROCEDURE — 74011250636 HC RX REV CODE- 250/636

## 2017-05-11 PROCEDURE — 74011000250 HC RX REV CODE- 250

## 2017-05-11 PROCEDURE — 74011250636 HC RX REV CODE- 250/636: Performed by: SURGERY

## 2017-05-11 PROCEDURE — 0WJG0ZZ INSPECTION OF PERITONEAL CAVITY, OPEN APPROACH: ICD-10-PCS | Performed by: SURGERY

## 2017-05-11 PROCEDURE — 77030008771 HC TU NG SALEM SUMP -A: Performed by: ANESTHESIOLOGY

## 2017-05-11 PROCEDURE — 77030008467 HC STPLR SKN COVD -B: Performed by: SURGERY

## 2017-05-11 PROCEDURE — 77030011264 HC ELECTRD BLD EXT COVD -A: Performed by: SURGERY

## 2017-05-11 PROCEDURE — 77030002966 HC SUT PDS J&J -A: Performed by: SURGERY

## 2017-05-11 PROCEDURE — 82803 BLOOD GASES ANY COMBINATION: CPT

## 2017-05-11 PROCEDURE — 87040 BLOOD CULTURE FOR BACTERIA: CPT | Performed by: SURGERY

## 2017-05-11 PROCEDURE — 0DNS0ZZ RELEASE GREATER OMENTUM, OPEN APPROACH: ICD-10-PCS | Performed by: SURGERY

## 2017-05-11 PROCEDURE — 71010 XR CHEST PORT: CPT

## 2017-05-11 PROCEDURE — 77030019908 HC STETH ESOPH SIMS -A: Performed by: ANESTHESIOLOGY

## 2017-05-11 PROCEDURE — 77030008703 HC TU ET UNCUF COVD -A: Performed by: ANESTHESIOLOGY

## 2017-05-11 PROCEDURE — 0D9770Z DRAINAGE OF STOMACH, PYLORUS WITH DRAINAGE DEVICE, VIA NATURAL OR ARTIFICIAL OPENING: ICD-10-PCS | Performed by: SURGERY

## 2017-05-11 PROCEDURE — 77030019940 HC BLNKT HYPOTHRM STRY -B: Performed by: ANESTHESIOLOGY

## 2017-05-11 PROCEDURE — 36415 COLL VENOUS BLD VENIPUNCTURE: CPT | Performed by: SURGERY

## 2017-05-11 PROCEDURE — 77030034850

## 2017-05-11 PROCEDURE — 80053 COMPREHEN METABOLIC PANEL: CPT | Performed by: SURGERY

## 2017-05-11 PROCEDURE — 77030011640 HC PAD GRND REM COVD -A: Performed by: SURGERY

## 2017-05-11 PROCEDURE — 65610000001 HC ROOM ICU GENERAL

## 2017-05-11 PROCEDURE — 0DQ80ZZ REPAIR SMALL INTESTINE, OPEN APPROACH: ICD-10-PCS | Performed by: SURGERY

## 2017-05-11 PROCEDURE — 77030025240 HC RELD STPL GIA 2 COVD -C: Performed by: SURGERY

## 2017-05-11 PROCEDURE — 0WJP0ZZ INSPECTION OF GASTROINTESTINAL TRACT, OPEN APPROACH: ICD-10-PCS | Performed by: SURGERY

## 2017-05-11 PROCEDURE — 77030018836 HC SOL IRR NACL ICUM -A: Performed by: SURGERY

## 2017-05-11 PROCEDURE — 0DB80ZZ EXCISION OF SMALL INTESTINE, OPEN APPROACH: ICD-10-PCS | Performed by: SURGERY

## 2017-05-11 PROCEDURE — 74011000258 HC RX REV CODE- 258: Performed by: SURGERY

## 2017-05-11 PROCEDURE — 87641 MR-STAPH DNA AMP PROBE: CPT | Performed by: SURGERY

## 2017-05-11 PROCEDURE — 77030008771 HC TU NG SALEM SUMP -A

## 2017-05-11 PROCEDURE — 0DN80ZZ RELEASE SMALL INTESTINE, OPEN APPROACH: ICD-10-PCS | Performed by: SURGERY

## 2017-05-11 PROCEDURE — 77030008477 HC STYL SATN SLP COVD -A: Performed by: ANESTHESIOLOGY

## 2017-05-11 PROCEDURE — 85027 COMPLETE CBC AUTOMATED: CPT | Performed by: SURGERY

## 2017-05-11 PROCEDURE — 77030032490 HC SLV COMPR SCD KNE COVD -B

## 2017-05-11 PROCEDURE — 77030019927 HC TBNG IRR CYSTO BAXT -A: Performed by: SURGERY

## 2017-05-11 PROCEDURE — 83605 ASSAY OF LACTIC ACID: CPT | Performed by: SURGERY

## 2017-05-11 PROCEDURE — 99254 IP/OBS CNSLTJ NEW/EST MOD 60: CPT | Performed by: INTERNAL MEDICINE

## 2017-05-11 PROCEDURE — 76010000162 HC OR TIME 1.5 TO 2 HR INTENSV-TIER 1: Performed by: SURGERY

## 2017-05-11 PROCEDURE — 74011250636 HC RX REV CODE- 250/636: Performed by: INTERNAL MEDICINE

## 2017-05-11 PROCEDURE — 36600 WITHDRAWAL OF ARTERIAL BLOOD: CPT

## 2017-05-11 PROCEDURE — 77030019605

## 2017-05-11 PROCEDURE — 77030011278 HC ELECTRD LIG IMPT COVD -F: Performed by: SURGERY

## 2017-05-11 PROCEDURE — 77030034818 HC STPLR INT GIA COVD -C: Performed by: SURGERY

## 2017-05-11 RX ORDER — SODIUM CHLORIDE 0.9 % (FLUSH) 0.9 %
5-10 SYRINGE (ML) INJECTION EVERY 8 HOURS
Status: DISCONTINUED | OUTPATIENT
Start: 2017-05-11 | End: 2017-05-15 | Stop reason: HOSPADM

## 2017-05-11 RX ORDER — HYDROMORPHONE HYDROCHLORIDE 2 MG/ML
INJECTION, SOLUTION INTRAMUSCULAR; INTRAVENOUS; SUBCUTANEOUS AS NEEDED
Status: DISCONTINUED | OUTPATIENT
Start: 2017-05-11 | End: 2017-05-11 | Stop reason: HOSPADM

## 2017-05-11 RX ORDER — ENOXAPARIN SODIUM 100 MG/ML
40 INJECTION SUBCUTANEOUS EVERY 12 HOURS
Status: DISCONTINUED | OUTPATIENT
Start: 2017-05-12 | End: 2017-05-15 | Stop reason: HOSPADM

## 2017-05-11 RX ORDER — FENTANYL CITRATE 50 UG/ML
INJECTION, SOLUTION INTRAMUSCULAR; INTRAVENOUS AS NEEDED
Status: DISCONTINUED | OUTPATIENT
Start: 2017-05-11 | End: 2017-05-11 | Stop reason: HOSPADM

## 2017-05-11 RX ORDER — ONDANSETRON 2 MG/ML
4 INJECTION INTRAMUSCULAR; INTRAVENOUS
Status: DISCONTINUED | OUTPATIENT
Start: 2017-05-11 | End: 2017-05-15 | Stop reason: HOSPADM

## 2017-05-11 RX ORDER — DEXAMETHASONE SODIUM PHOSPHATE 4 MG/ML
INJECTION, SOLUTION INTRA-ARTICULAR; INTRALESIONAL; INTRAMUSCULAR; INTRAVENOUS; SOFT TISSUE AS NEEDED
Status: DISCONTINUED | OUTPATIENT
Start: 2017-05-11 | End: 2017-05-11 | Stop reason: HOSPADM

## 2017-05-11 RX ORDER — ENALAPRILAT 1.25 MG/ML
1.25 INJECTION INTRAVENOUS
Status: DISCONTINUED | OUTPATIENT
Start: 2017-05-11 | End: 2017-05-11 | Stop reason: SDUPTHER

## 2017-05-11 RX ORDER — SODIUM CHLORIDE 0.9 % (FLUSH) 0.9 %
5-10 SYRINGE (ML) INJECTION AS NEEDED
Status: DISCONTINUED | OUTPATIENT
Start: 2017-05-11 | End: 2017-05-15 | Stop reason: HOSPADM

## 2017-05-11 RX ORDER — DEXTROSE 40 %
1 GEL (GRAM) ORAL AS NEEDED
Status: DISCONTINUED | OUTPATIENT
Start: 2017-05-11 | End: 2017-05-15 | Stop reason: HOSPADM

## 2017-05-11 RX ORDER — VECURONIUM BROMIDE FOR INJECTION 1 MG/ML
INJECTION, POWDER, LYOPHILIZED, FOR SOLUTION INTRAVENOUS AS NEEDED
Status: DISCONTINUED | OUTPATIENT
Start: 2017-05-11 | End: 2017-05-11 | Stop reason: HOSPADM

## 2017-05-11 RX ORDER — ROCURONIUM BROMIDE 10 MG/ML
INJECTION, SOLUTION INTRAVENOUS AS NEEDED
Status: DISCONTINUED | OUTPATIENT
Start: 2017-05-11 | End: 2017-05-11 | Stop reason: HOSPADM

## 2017-05-11 RX ORDER — DEXTROSE 50 % IN WATER (D50W) INTRAVENOUS SYRINGE
25-50 AS NEEDED
Status: DISCONTINUED | OUTPATIENT
Start: 2017-05-11 | End: 2017-05-15 | Stop reason: HOSPADM

## 2017-05-11 RX ORDER — MIDAZOLAM HYDROCHLORIDE 1 MG/ML
INJECTION, SOLUTION INTRAMUSCULAR; INTRAVENOUS AS NEEDED
Status: DISCONTINUED | OUTPATIENT
Start: 2017-05-11 | End: 2017-05-11 | Stop reason: HOSPADM

## 2017-05-11 RX ORDER — METOPROLOL TARTRATE 5 MG/5ML
1.25 INJECTION INTRAVENOUS
Status: DISCONTINUED | OUTPATIENT
Start: 2017-05-11 | End: 2017-05-11

## 2017-05-11 RX ORDER — SUCCINYLCHOLINE CHLORIDE 20 MG/ML
INJECTION INTRAMUSCULAR; INTRAVENOUS AS NEEDED
Status: DISCONTINUED | OUTPATIENT
Start: 2017-05-11 | End: 2017-05-11 | Stop reason: HOSPADM

## 2017-05-11 RX ORDER — ACETAMINOPHEN 10 MG/ML
1000 INJECTION, SOLUTION INTRAVENOUS EVERY 6 HOURS
Status: COMPLETED | OUTPATIENT
Start: 2017-05-11 | End: 2017-05-12

## 2017-05-11 RX ORDER — NALOXONE HYDROCHLORIDE 0.4 MG/ML
0.4 INJECTION, SOLUTION INTRAMUSCULAR; INTRAVENOUS; SUBCUTANEOUS AS NEEDED
Status: DISCONTINUED | OUTPATIENT
Start: 2017-05-11 | End: 2017-05-15 | Stop reason: HOSPADM

## 2017-05-11 RX ORDER — PROPOFOL 10 MG/ML
5-50 VIAL (ML) INTRAVENOUS
Status: DISCONTINUED | OUTPATIENT
Start: 2017-05-11 | End: 2017-05-13

## 2017-05-11 RX ORDER — ONDANSETRON 2 MG/ML
INJECTION INTRAMUSCULAR; INTRAVENOUS AS NEEDED
Status: DISCONTINUED | OUTPATIENT
Start: 2017-05-11 | End: 2017-05-11 | Stop reason: HOSPADM

## 2017-05-11 RX ORDER — FAMOTIDINE 10 MG/ML
20 INJECTION INTRAVENOUS EVERY 12 HOURS
Status: DISCONTINUED | OUTPATIENT
Start: 2017-05-11 | End: 2017-05-11

## 2017-05-11 RX ORDER — HYDROMORPHONE HYDROCHLORIDE 1 MG/ML
1 INJECTION, SOLUTION INTRAMUSCULAR; INTRAVENOUS; SUBCUTANEOUS
Status: DISCONTINUED | OUTPATIENT
Start: 2017-05-11 | End: 2017-05-11 | Stop reason: SDUPTHER

## 2017-05-11 RX ORDER — VANCOMYCIN 2 GRAM/500 ML IN 0.9 % SODIUM CHLORIDE INTRAVENOUS
2000 EVERY 12 HOURS
Status: DISCONTINUED | OUTPATIENT
Start: 2017-05-11 | End: 2017-05-13

## 2017-05-11 RX ORDER — HYDROMORPHONE HYDROCHLORIDE 1 MG/ML
0.5 INJECTION, SOLUTION INTRAMUSCULAR; INTRAVENOUS; SUBCUTANEOUS
Status: DISCONTINUED | OUTPATIENT
Start: 2017-05-11 | End: 2017-05-11

## 2017-05-11 RX ORDER — LIDOCAINE HYDROCHLORIDE 20 MG/ML
INJECTION, SOLUTION EPIDURAL; INFILTRATION; INTRACAUDAL; PERINEURAL AS NEEDED
Status: DISCONTINUED | OUTPATIENT
Start: 2017-05-11 | End: 2017-05-11 | Stop reason: HOSPADM

## 2017-05-11 RX ORDER — HYDROMORPHONE HYDROCHLORIDE 1 MG/ML
0.5 INJECTION, SOLUTION INTRAMUSCULAR; INTRAVENOUS; SUBCUTANEOUS
Status: DISCONTINUED | OUTPATIENT
Start: 2017-05-11 | End: 2017-05-15 | Stop reason: HOSPADM

## 2017-05-11 RX ORDER — PROPOFOL 10 MG/ML
INJECTION, EMULSION INTRAVENOUS AS NEEDED
Status: DISCONTINUED | OUTPATIENT
Start: 2017-05-11 | End: 2017-05-11 | Stop reason: HOSPADM

## 2017-05-11 RX ORDER — METOPROLOL TARTRATE 5 MG/5ML
1.25 INJECTION INTRAVENOUS
Status: DISCONTINUED | OUTPATIENT
Start: 2017-05-11 | End: 2017-05-15 | Stop reason: HOSPADM

## 2017-05-11 RX ORDER — SODIUM CHLORIDE AND POTASSIUM CHLORIDE .9; .15 G/100ML; G/100ML
100 SOLUTION INTRAVENOUS CONTINUOUS
Status: DISCONTINUED | OUTPATIENT
Start: 2017-05-11 | End: 2017-05-15

## 2017-05-11 RX ORDER — SODIUM CHLORIDE, SODIUM LACTATE, POTASSIUM CHLORIDE, CALCIUM CHLORIDE 600; 310; 30; 20 MG/100ML; MG/100ML; MG/100ML; MG/100ML
150 INJECTION, SOLUTION INTRAVENOUS CONTINUOUS
Status: DISCONTINUED | OUTPATIENT
Start: 2017-05-11 | End: 2017-05-11

## 2017-05-11 RX ORDER — HYDROMORPHONE HYDROCHLORIDE 1 MG/ML
1 INJECTION, SOLUTION INTRAMUSCULAR; INTRAVENOUS; SUBCUTANEOUS
Status: DISCONTINUED | OUTPATIENT
Start: 2017-05-11 | End: 2017-05-15 | Stop reason: HOSPADM

## 2017-05-11 RX ORDER — ENOXAPARIN SODIUM 100 MG/ML
40 INJECTION SUBCUTANEOUS EVERY 24 HOURS
Status: DISCONTINUED | OUTPATIENT
Start: 2017-05-11 | End: 2017-05-11

## 2017-05-11 RX ORDER — ONDANSETRON 2 MG/ML
4 INJECTION INTRAMUSCULAR; INTRAVENOUS
Status: DISCONTINUED | OUTPATIENT
Start: 2017-05-11 | End: 2017-05-11 | Stop reason: SDUPTHER

## 2017-05-11 RX ORDER — ENALAPRILAT 1.25 MG/ML
1.25 INJECTION INTRAVENOUS
Status: DISCONTINUED | OUTPATIENT
Start: 2017-05-11 | End: 2017-05-15 | Stop reason: HOSPADM

## 2017-05-11 RX ORDER — DIPHENHYDRAMINE HYDROCHLORIDE 50 MG/ML
12.5 INJECTION, SOLUTION INTRAMUSCULAR; INTRAVENOUS
Status: DISCONTINUED | OUTPATIENT
Start: 2017-05-11 | End: 2017-05-15 | Stop reason: HOSPADM

## 2017-05-11 RX ORDER — LORAZEPAM 2 MG/ML
1 INJECTION INTRAMUSCULAR
Status: DISCONTINUED | OUTPATIENT
Start: 2017-05-11 | End: 2017-05-15 | Stop reason: HOSPADM

## 2017-05-11 RX ORDER — FENTANYL CITRATE-0.9 % NACL/PF 25 MCG/ML
25-200 PLASTIC BAG, INJECTION (ML) INJECTION
Status: DISCONTINUED | OUTPATIENT
Start: 2017-05-11 | End: 2017-05-15

## 2017-05-11 RX ADMIN — PIPERACILLIN, TAZOBACTAM 4.5 G: 4; .5 INJECTION, POWDER, LYOPHILIZED, FOR SOLUTION INTRAVENOUS at 11:43

## 2017-05-11 RX ADMIN — SODIUM CHLORIDE 12.5 MG: 9 INJECTION INTRAMUSCULAR; INTRAVENOUS; SUBCUTANEOUS at 03:45

## 2017-05-11 RX ADMIN — VANCOMYCIN HYDROCHLORIDE 1000 MG: 1 INJECTION, POWDER, LYOPHILIZED, FOR SOLUTION INTRAVENOUS at 08:16

## 2017-05-11 RX ADMIN — FENTANYL CITRATE 100 MCG: 50 INJECTION, SOLUTION INTRAMUSCULAR; INTRAVENOUS at 09:52

## 2017-05-11 RX ADMIN — ROCURONIUM BROMIDE 10 MG: 10 INJECTION, SOLUTION INTRAVENOUS at 09:52

## 2017-05-11 RX ADMIN — ONDANSETRON 4 MG: 2 INJECTION INTRAMUSCULAR; INTRAVENOUS at 10:28

## 2017-05-11 RX ADMIN — FAMOTIDINE 20 MG: 10 INJECTION, SOLUTION INTRAVENOUS at 08:16

## 2017-05-11 RX ADMIN — SODIUM CHLORIDE, SODIUM LACTATE, POTASSIUM CHLORIDE, AND CALCIUM CHLORIDE: 600; 310; 30; 20 INJECTION, SOLUTION INTRAVENOUS at 10:26

## 2017-05-11 RX ADMIN — LIDOCAINE HYDROCHLORIDE 60 MG: 20 INJECTION, SOLUTION EPIDURAL; INFILTRATION; INTRACAUDAL; PERINEURAL at 09:52

## 2017-05-11 RX ADMIN — VANCOMYCIN HYDROCHLORIDE 2000 MG: 10 INJECTION, POWDER, LYOPHILIZED, FOR SOLUTION INTRAVENOUS at 19:50

## 2017-05-11 RX ADMIN — SODIUM CHLORIDE AND POTASSIUM CHLORIDE 100 ML/HR: 9; 1.49 INJECTION, SOLUTION INTRAVENOUS at 22:12

## 2017-05-11 RX ADMIN — Medication 25 MCG/HR: at 16:44

## 2017-05-11 RX ADMIN — VECURONIUM BROMIDE FOR INJECTION 2 MG: 1 INJECTION, POWDER, LYOPHILIZED, FOR SOLUTION INTRAVENOUS at 10:20

## 2017-05-11 RX ADMIN — MIDAZOLAM HYDROCHLORIDE 2 MG: 1 INJECTION, SOLUTION INTRAMUSCULAR; INTRAVENOUS at 11:20

## 2017-05-11 RX ADMIN — HYDROMORPHONE HYDROCHLORIDE 1 MG: 1 INJECTION, SOLUTION INTRAMUSCULAR; INTRAVENOUS; SUBCUTANEOUS at 15:57

## 2017-05-11 RX ADMIN — DEXAMETHASONE SODIUM PHOSPHATE 4 MG: 4 INJECTION, SOLUTION INTRA-ARTICULAR; INTRALESIONAL; INTRAMUSCULAR; INTRAVENOUS; SOFT TISSUE at 10:28

## 2017-05-11 RX ADMIN — VECURONIUM BROMIDE FOR INJECTION 2 MG: 1 INJECTION, POWDER, LYOPHILIZED, FOR SOLUTION INTRAVENOUS at 10:36

## 2017-05-11 RX ADMIN — HYDROMORPHONE HYDROCHLORIDE 1 MG: 1 INJECTION, SOLUTION INTRAMUSCULAR; INTRAVENOUS; SUBCUTANEOUS at 12:07

## 2017-05-11 RX ADMIN — PIPERACILLIN, TAZOBACTAM 4.5 G: 4; .5 INJECTION, POWDER, LYOPHILIZED, FOR SOLUTION INTRAVENOUS at 05:42

## 2017-05-11 RX ADMIN — HYDROMORPHONE HYDROCHLORIDE 0.5 MG: 1 INJECTION, SOLUTION INTRAMUSCULAR; INTRAVENOUS; SUBCUTANEOUS at 21:01

## 2017-05-11 RX ADMIN — ACETAMINOPHEN 1000 MG: 10 INJECTION, SOLUTION INTRAVENOUS at 11:43

## 2017-05-11 RX ADMIN — PROPOFOL 5 MCG/KG/MIN: 10 INJECTION, EMULSION INTRAVENOUS at 13:43

## 2017-05-11 RX ADMIN — SODIUM CHLORIDE 12.5 MG: 9 INJECTION INTRAMUSCULAR; INTRAVENOUS; SUBCUTANEOUS at 07:34

## 2017-05-11 RX ADMIN — Medication 10 ML: at 22:11

## 2017-05-11 RX ADMIN — ONDANSETRON 4 MG: 2 INJECTION INTRAMUSCULAR; INTRAVENOUS at 22:12

## 2017-05-11 RX ADMIN — SUCCINYLCHOLINE CHLORIDE 200 MG: 20 INJECTION INTRAMUSCULAR; INTRAVENOUS at 09:52

## 2017-05-11 RX ADMIN — HYDROMORPHONE HYDROCHLORIDE 1 MG: 2 INJECTION, SOLUTION INTRAMUSCULAR; INTRAVENOUS; SUBCUTANEOUS at 10:35

## 2017-05-11 RX ADMIN — PROPOFOL 200 MG: 10 INJECTION, EMULSION INTRAVENOUS at 09:52

## 2017-05-11 RX ADMIN — ACETAMINOPHEN 1000 MG: 10 INJECTION, SOLUTION INTRAVENOUS at 17:14

## 2017-05-11 RX ADMIN — SODIUM CHLORIDE 40 MG: 9 INJECTION INTRAMUSCULAR; INTRAVENOUS; SUBCUTANEOUS at 11:43

## 2017-05-11 RX ADMIN — SODIUM CHLORIDE, SODIUM LACTATE, POTASSIUM CHLORIDE, AND CALCIUM CHLORIDE 150 ML/HR: 600; 310; 30; 20 INJECTION, SOLUTION INTRAVENOUS at 03:42

## 2017-05-11 RX ADMIN — PIPERACILLIN, TAZOBACTAM 4.5 G: 4; .5 INJECTION, POWDER, LYOPHILIZED, FOR SOLUTION INTRAVENOUS at 19:57

## 2017-05-11 RX ADMIN — FENTANYL CITRATE 50 MCG: 50 INJECTION, SOLUTION INTRAMUSCULAR; INTRAVENOUS at 10:25

## 2017-05-11 RX ADMIN — SODIUM CHLORIDE 1000 ML: 900 INJECTION, SOLUTION INTRAVENOUS at 08:57

## 2017-05-11 RX ADMIN — SODIUM CHLORIDE, SODIUM LACTATE, POTASSIUM CHLORIDE, AND CALCIUM CHLORIDE: 600; 310; 30; 20 INJECTION, SOLUTION INTRAVENOUS at 10:48

## 2017-05-11 RX ADMIN — SODIUM CHLORIDE AND POTASSIUM CHLORIDE 100 ML/HR: 9; 1.49 INJECTION, SOLUTION INTRAVENOUS at 11:45

## 2017-05-11 RX ADMIN — VECURONIUM BROMIDE FOR INJECTION 3 MG: 1 INJECTION, POWDER, LYOPHILIZED, FOR SOLUTION INTRAVENOUS at 10:50

## 2017-05-11 NOTE — PROGRESS NOTES
Scooter CT disc would not work in the radiology workstation. Images were then loaded by tech into PACS     I reviewed the images with radiologist on call and the patient has an SBO   possibly coupled post-op mesenteric gas if mesentery was inadvertently insufflated during the case   vs contained small bowel injury (contained by mesentery) just to the left of midline above the umbilicus at the level of the left sided trochar site. Radiology sees no free air, free fluid, oral contrast extravasation, or abscess. Mesenteric vessels are patent.     No fevers, WBC 5.9, /87 and stable  BUN/Cr 26/1.13   Lactic acid normal at 1.5  Room air ABG--> 7.48/35.2/59.4    Treat SBO for now  NPO/IVF  His NGT with > 1 Liter bilious output    Empiric abx    I will discuss case with Dr Gloria Tompkins

## 2017-05-11 NOTE — OP NOTES
[facility] 64   OPERATIVE REPORT       Name:  Eileen Crenshaw   MR#:  [id number]   :  1965   Account #:  [de-identified]   Date of Adm:  2017       DATE OF PROCEDURE: 2017    PREOPERATIVE DIAGNOSIS: Peritonitis. POSTOPERATIVE DIAGNOSIS: Small bowel injury with a small-bowel   obstruction. NAME OF PROCEDURE   1. Exploratory laparotomy. 2. Lysis of adhesions. 3. Small bowel resection with primary anastomosis. SURGEON: Tessie Rojas MD.    ANESTHESIA: General endotracheal anesthesia. ESTIMATED BLOOD LOSS: 50 mL. SPECIMENS: Segment of small bowel containing bowel injury. HISTORY: This is a 58-year-old gentleman who had a Lap-Band   placed in , at [facility] in [place]. The surgeon who placed the band saw the patient numerous times,   removed all the fluid. However, he has continued to have problems. He has had epigastric pain, lower chest pain, dysphagia, reflux,   nausea and vomiting. The patient had an upper GI series which   showed delayed passage through the band with some dilatation of   the esophagus; all indicative of a slipped band. I saw the   patient as the patient's surgeon no longer had bariatric   privileges and we recommended removal of the band which was done   on 2017. The band placement was much different than I   normally place it. He also had adhesions between the small bowel   and the anterior abdominal wall and the omentum and the anterior   abdominal wall. He had small bowel adhesions at the trocar site   where he had the port and the tubing going up into the port. The   band was removed. He went home the same day. I did not hear from   him at all and apparently he went to [facility] yesterday with nausea, vomiting, and abdominal pain. He   had a CT scan done and a call was placed to [facility] where   Dr. Des Mckinnon  was on call.  Dr. Des Mckinnon recommended the patient   be transferred, placed in the ICU, saw him and called me. He   called me at 4:42 a.m. but my phone was off. He then called again   at 7:30 and I spoke to Dr. Tania Chi at that time, said it was   coming in. I had a patient to see at the [place]. I came in to see   the patient. I had already called the OR and asked for an   operating room time. However, they did not have any time until   after 5 o'clock. I saw the patient. He was tachycardic with a   heart rate of 110. His abdomen was markedly distended. He had   diffuse tenderness with rebound and guarding, all indicative of   peritonitis. His urine was quite concentrated almost looked   like orange juice in his [place] collection bag. I went down to   the OR and declared an emergency, saying that Mr. Kenny Greene needed to   go to the operating room as soon as one could be made available. The operating room was accommodating and they got a   room relatively quickly. At this point, I went through an   exploratory laparotomy, said this could not be done   laparoscopically and I said it may be necessary to resect a   segment of bowel. The patient was agreeable, signed a consent   form and was brought directly from the ICU bed 7 to the   operating room. DESCRIPTION OF PROCEDURE: He was seen in the preoperative area   by Dr. Govind Sevilla and then transported to room #11, where surgery   was performed. He was transferred from the ICU bed to the   operating room table. The IVs and arterial line were all hooked   up by Dr. Govind Sevilla and Mu Kitchen, the CRNA. The abdomen was prepped   and draped in the usual sterile manner. I did a timeout   identifying the patient, surgeon, procedure and his birthday of   [birth year]. Once everyone agreed, we began the procedure. I made   a midline incision superior to the umbilicus carrying it down   below the umbilicus on the left hand side of the umbilicus. Incision was carried through the skin. The soft tissue was   divided with electrocautery.  Midline fascia was found and was   incised with a 15 scalpel blade. I placed a finger in and was able   to then divide the rest of the fascia, the length of the skin   incision with electrocautery. The bowel was markedly distended. There was some contamination of green colored bilious material.   I found an area of small bowel that was directly below where we   had to used our first trocar and sure enough, there was an   injury to the small bowel where it was leaking bilious material.   We quickly sewed these up to prevent any further contamination. The bowel itself did not look very good. Just proximal to this,   there was an area where the bowel was kinked due to the   mesentery. I have a feeling this has been a chronic problem, but   at this point with the distention of the intestines due to an   ileus, there was complete bowel obstruction at this point. I   elected to resect this entire area with the injury and the area   where the bowel was kinked. As I said, I have a feeling this was   a chronic problem as the bowel was adherent to the anterior   abdominal wall and was up at an angle. I believe the mesentery and   scarred down, producing a partial obstruction. With the   distention, the bowel was now complete obstruction. The bowel   proximal to this was markedly dilated. I palpated the NG tube. It was going back up into the esophagus. We pulled it back so the   tip laid in the antrum of the stomach. We did some lysis of   adhesions. There were some adhesions still left between the   omentum and the anterior abdominal wall which I had left. These   were taken down with electrocautery and the Metzenbaum scissors. There were some adhesions between the loops of bowel which were   taken down with Metzenbaum scissors. At this point, we did a   resection. I divided the bowel proximal and distal with a FLAKITA 60   stapling cartridge with blue staple cartridge.  I oversewed both   of the staple line as the bowel was markedly distended and I did   not want to have the staple line \"blowout. \" We then did a side-  to-side anastomosis. I placed stay sutures of 3-0 silk proximal   and distal, and then opened both limbs of bowel. We decompressed   the bowel as much of air and fluid as we could to allow for   easier closure. This was done with the sterile NG tube. I made   apertures in both limbs of bowel and did a side-to-side   anastomosis with the 60 stapling device with a blue staple   cartridge. Checked the staple line. There was no evidence of   bleeding. I closed the aperture used for placement of the   stapling device in 2 layers. The mucosal layer was brought   together with a running 3-0 Vicryl. The seromuscular layer was   inverted with Lembert sutures of 3-0 silk. Mesenteric defect   between these 2 limbs of bowel was closed with running 2-0   silk. It should be noted that the mesentery for the bowel that   was resected was divided with the LigaSure Impact device. One   area that had some bleeding afterwards was oversewn with 2   figure-of-eight sutures of 2-0 silk. There was no bleeding from   the mesentery at the end of the procedure. Having created the   anastomosis and closed the mesenteric defect, we then irrigated   the abdomen with 3 L of warm saline. Surprisingly, there was not   as much contamination as I had originally anticipated. Once this   was done, the Bookwalter retractor, which had been used was   dismantled. Lap and instrument counts were found to be correct   x2 at which point we closed the fascia with two #1 looped PDS   sutures. Subcutaneous tissue was irrigated. Skin was closed with   4 vertical mattress sutures of 2-0 nylon and then surgical   staples in between these. The patient was left intubated and   will be returned to the intensive care unit on the ventilator as   this is the wishes of the anesthesia staff. He has some   underlying COPD. He is morbidly obese and they wish for him to   remain intubated. It was interesting, his heart rate at the   beginning of the procedure was 113. At the end, it was 87. His   urine began to become less concentrated and the volume increased   after receiving multiple liters of IV fluids. Overall, he   appeared to be much improved.         MD Rita Astudillo / Last Michael   D:  05/11/2017   11:27   T:  05/11/2017   12:49   Job #:  [id number]

## 2017-05-11 NOTE — PROGRESS NOTES
Pt to go to surgery immediatly. Called pt's sister Martha Elam, gave her an update, had pt sign ICU consent and consent for surgical procedure at this time.

## 2017-05-11 NOTE — PROGRESS NOTES
Dr. Mercy Carrillo at bedside. Orders for 1L NS bolus received. MD to contact OR to have pt's surgery as immediate as possible. Will call and update family contacts once plan of care is organized. Pt currently in NAD.

## 2017-05-11 NOTE — IP AVS SNAPSHOT
[address] 
[phone] Patient: Brooklynn Alfred MRN: [id number] :1965 You are allergic to the following Allergen Reactions Penicillins Rash Doesn't exactly remember, had it as a child Recent Documentation Height Weight BMI Smoking Status 1.778 m 134 kg 42.39 kg/m2 Never Smoker Unresulted Labs Order Current Status CULTURE, BLOOD Preliminary result CULTURE, BLOOD Preliminary result Emergency Contacts Name Discharge Info Relation Home Work Mobile Scott Hancock DISCHARGE CAREGIVER [3] Sister [23]   [phone] About your hospitalization You were admitted on:  May 11, 2017 You last received care in the:  [facility] 2 SURGICAL You were discharged on:  May 15, 2017 Unit phone number:  [phone] Why you were hospitalized Your primary diagnosis was:  Generalized Abdominal Pain Your diagnoses also included:  Intractable Cyclical Vomiting With Nausea, Sbo (Small Bowel Obstruction) (Hcc), Musculoskeletal Disorder, Morbid Obesity (Hcc), Contact Dermatitis And Other Eczema, Due To Unspecified Cause, Kamran On Cpap, Small Bowel Perforation (Hcc), Encounter For Weaning From Ventilator (Hcc) Providers Seen During Your Hospitalizations Provider Role Specialty Primary office phone Stormy Bar MD Attending Provider General Surgery [phone] Ashlie Bob MD Attending Provider General Surgery [phone] Your Primary Care Physician (PCP) Primary Care Physician Office Phone Office Fax Andree Michaels [address], [address] [phone] Follow-up Information Follow up With Details Comments Contact Info Ashlie Bob MD On 2017 1:40 [address] [address] 
[phone] Don Aguilar MD   [address] [place] [id number] [phone] Your Appointments Monday May 22, 2017  1:40 PM EDT  
GPO BAR with Hargis Harada, MD  
[place] [facility] ([address] [facility]) [address] [place] [id number] [phone] Current Discharge Medication List  
  
START taking these medications Dose & Instructions Dispensing Information Comments Morning Noon Evening Bedtime  
 amoxicillin-clavulanate 875-125 mg per tablet Commonly known as:  AUGMENTIN Your next dose is:  Tomorrow morning Dose:  1 Tab Take 1 Tab by mouth every twelve (12) hours for 7 days. Quantity:  14 Tab Refills:  0  
     
  
   
   
  
   
  
 oxyCODONE-acetaminophen 5-325 mg per tablet Commonly known as:  PERCOCET Your next dose is:  Every 4 hours as needed for pain. 
'  
   
 Dose:  1-2 Tab Take 1-2 Tabs by mouth every four (4) hours as needed. Max Daily Amount: 12 Tabs. Quantity:  40 Tab Refills:  0 CONTINUE these medications which have NOT CHANGED Dose & Instructions Dispensing Information Comments Morning Noon Evening Bedtime FLONASE 50 mcg/actuation nasal spray Generic drug:  fluticasone Your next dose is: Tonight Dose:  2 Spray 2 Sprays by Both Nostrils route nightly. Indications: ALLERGIC RHINITIS Refills:  0 Where to Get Your Medications Information on where to get these meds will be given to you by the nurse or doctor. ! Ask your nurse or doctor about these medications  
  amoxicillin-clavulanate 875-125 mg per tablet  
 oxyCODONE-acetaminophen 5-325 mg per tablet Discharge Instructions MD Instructions: Follow-up with Dr. Abelardo Schuler as directed. Keep incision/staples clean and dry, may remain uncovered. Do not apply lotions, creams or ointments to incisions/barry. Brendolyn Conception will be removed at follow-up appointment. Diet - as tolerated - Soft foods diet. Activity - ambulate - as tolerated - no heavy lifting >10lb. May shower - no tub baths or soaking/submerging. No driving while taking narcotics. Do not drink alcohol while taking narcotics. Resume other home medications. If problems or questions arise, please call our office at [phone]. DISCHARGE SUMMARY from Nurse The following personal items are in your possession at time of discharge: 
 
Dental Appliances: None Home Medications: None Jewelry: None Clothing: Pants, Undergarments, With patient Other Valuables: None PATIENT INSTRUCTIONS: 
 
 
F-face looks uneven A-arms unable to move or move unevenly S-speech slurred or non-existent T-time-call 911 as soon as signs and symptoms begin-DO NOT go Back to bed or wait to see if you get better-TIME IS BRAIN. Warning Signs of HEART ATTACK Call 911 if you have these symptoms: 
? Chest discomfort. Most heart attacks involve discomfort in the center of the chest that lasts more than a few minutes, or that goes away and comes back. It can feel like uncomfortable pressure, squeezing, fullness, or pain. ? Discomfort in other areas of the upper body. Symptoms can include pain or discomfort in one or both arms, the back, neck, jaw, or stomach. ? Shortness of breath with or without chest discomfort. ? Other signs may include breaking out in a cold sweat, nausea, or lightheadedness. Don't wait more than five minutes to call [address]! Fast action can save your life. Calling 911 is almost always the fastest way to get lifesaving treatment.  Emergency Medical Services staff can begin treatment when they arrive  up to an hour sooner than if someone gets to the hospital by car. The discharge information has been reviewed with the {PATIENT PARENT GUARDIAN:84763}. The {PATIENT PARENT GUARDIAN:02511} verbalized understanding. Discharge medications reviewed with the {Dishcarge meds reviewed IOWL:47582} and appropriate educational materials and side effects teaching were provided. Discharge Instructions Attachments/References BOWEL RESECTION: LAPAROSCOPIC: POST-OP (ENGLISH) Discharge Orders None [facility] Announcement We are excited to announce that we are making your provider's discharge notes available to you in [facility]. You will see these notes when they are completed and signed by the physician that discharged you from your recent hospital stay. If you have any questions or concerns about any information you see in [facility], please call the Health Information Department where you were seen or reach out to your Primary Care Provider for more information about your plan of care. Introducing \[facility] [place] [facility]\"" & [facility] SERVICES! Trevor Carballo introduces [facility] patient portal. Now you can access parts of your medical record, email your doctor's office, and request medication refills online. 1. In your internet browser, go to [email or web address] 2. Click on the First Time User? Click Here link in the Sign In box. You will see the New Member Sign Up page. 3. Enter your [facility] Access Code exactly as it appears below. You will not need to use this code after youve completed the sign-up process. If you do not sign up before the expiration date, you must request a new code. · [facility] Access Code: [id number] Expires: 6/19/2017  5:15 PM 
 
4. Enter the last four digits of your Social Security Number (xxxx) and Date of Birth (mm/dd/yyyy) as indicated and click Submit. You will be taken to the next sign-up page. 5. Create a Played ID. This will be your Played login ID and cannot be changed, so think of one that is secure and easy to remember. 6. Create a Played password. You can change your password at any time. 7. Enter your Password Reset Question and Answer. This can be used at a later time if you forget your password. 8. Enter your e-mail address. You will receive e-mail notification when new information is available in [address] 9. Click Sign Up. You can now view and download portions of your medical record. 10. Click the Download Summary menu link to download a portable copy of your medical information. If you have questions, please visit the Frequently Asked Questions section of the Played website. Remember, Played is NOT to be used for urgent needs. For medical emergencies, dial 911. Now available from your iPhone and Android! General Information Please provide this summary of care documentation to your next provider. Patient Signature:  ____________________________________________________________ Date:  ____________________________________________________________  
  
Baron Garcia Provider Signature:  ____________________________________________________________ Date:  ____________________________________________________________ More Information Laparoscopic Bowel Resection: What to Expect at Home Your Recovery You have had part of your small or large intestine taken out. You are likely to have pain that comes and goes for the next few days. You may feel like you have the flu. You also may have a low fever and feel tired and nauseated. This is common. You should feel better after 1 to 2 weeks and will probably be back to normal in 2 to 4 weeks. Your bowel movements may not be regular for several weeks. Also, you may have some blood in your stool.  
This care sheet gives you a general idea about how long it will take for you to recover. But each person recovers at a different pace. Follow the steps below to get better as quickly as possible. How can you care for yourself at home? Activity · Rest when you feel tired. Getting enough sleep will help you recover. · Try to walk each day. Start by walking a little more than you did the day before. Bit by bit, increase the amount you walk. Walking boosts blood flow and helps prevent pneumonia and constipation. · Avoid strenuous activities, such as biking, jogging, weight lifting, or aerobic exercise, until your doctor says it is okay. · Avoid lifting anything that would make you strain. This may include heavy grocery bags and milk containers, a heavy briefcase or backpack, cat litter or dog food bags, a vacuum , or a child. · Ask your doctor when you can drive again. · You will probably need to take 2 to 4 weeks off from work. It depends on the type of work you do and how you feel. · You may shower 24 to 48 hours after surgery, if your doctor says it is okay. Pat the cut (incision) dry. Do not take a bath for the first 2 weeks, or until your doctor tells you it is okay. · Ask your doctor when it is okay for you to have sex. Diet · You may not have much appetite after the surgery. But try to eat a healthy diet. Your doctor will tell you about any foods you should not eat. · Eat a low-fiber diet for several weeks after surgery. Eat many small meals throughout the day. Add high-fiber foods a little at a time. · Eat yogurt. It puts good bacteria into your colon and helps prevent diarrhea. · Try to avoid nuts, seeds, and corn for a while. They may be hard to digest. 
· You may need to take vitamins that contain sodium and potassium. Your doctor will tell you whether you should take any vitamins or supplements. · Drink plenty of fluids (enough so that your urine is light yellow or clear like water) to prevent dehydration.  Choose water and other caffeine-free clear liquids until you feel better. If you have kidney, heart, or liver disease and have to limit fluids, talk with your doctor before you increase the amount of fluids you drink. Medicines · Your doctor will tell you if and when you can restart your medicines. He or she will also give you instructions about taking any new medicines. · If you take blood thinners, such as warfarin (Coumadin), clopidogrel (Plavix), or aspirin, be sure to talk to your doctor. He or she will tell you if and when to start taking those medicines again. Make sure that you understand exactly what your doctor wants you to do. · Take pain medicines exactly as directed. ¨ If the doctor gave you a prescription medicine for pain, take it as prescribed. ¨ If you are not taking a prescription pain medicine, ask your doctor if you can take an over-the-counter medicine. · If your doctor prescribed antibiotics, take them as directed. Do not stop taking them just because you feel better. You need to take the full course of antibiotics. · You may need to take some medicines in a different form. You will be told whether to crush pills or take a liquid form of the medicine. · If your doctor gives you a stool softener, take it as directed. Incision care · If you have strips of tape on the incisions, leave the tape on for a week or until it falls off. · Wash the area daily with warm, soapy water and pat it dry. Don't use hydrogen peroxide or alcohol, which can slow healing. You may cover the area with a gauze bandage if it weeps or rubs against clothing. Change the bandage every day. Follow-up care is a key part of your treatment and safety. Be sure to make and go to all appointments, and call your doctor if you are having problems. It's also a good idea to know your test results and keep a list of the medicines you take. When should you call for help? Call 911 anytime you think you may need emergency care.  For example, call if: 
· You passed out (lost consciousness). · You have sudden chest pain and shortness of breath, or you cough up blood. · You have severe pain in your belly. Call your doctor now or seek immediate medical care if: 
· You have pain that does not get better after you take pain medicine. · You have loose stitches, or your incision comes open. · You are bleeding or have new drainage from the incision. · You develop a hard lump at the incision. · You have signs of infection, such as: 
¨ Increased pain, swelling, warmth, or redness. ¨ Red streaks leading from the incision. ¨ Pus draining from the incision. ¨ Swollen lymph nodes in your neck, armpits, or groin. ¨ A fever. Watch closely for any changes in your health, and be sure to contact your doctor if: 
· You do not have a bowel movement after taking a laxative. · You do not get better as expected. Where can you learn more? Go to http://erin-rolando.info/. Enter H191 in the search box to learn more about \"Laparoscopic Bowel Resection: What to Expect at Home. \" Current as of: August 9, 2016 Content Version: 11.2 © 0102-8420 [employer]. Care instructions adapted under license by [employer] (which disclaims liability or warranty for this information). If you have questions about a medical condition or this instruction, always ask your healthcare professional. [place] [address] any warranty or liability for your use of this information.

## 2017-05-11 NOTE — CONSULTS
CONSULT NOTE    Joceline Maldonado    2017    Date of Admission:  2017    The patient's chart is reviewed and the patient is discussed with the staff. Subjective:     Patient is a 47 y.o.  male seen and evaluated at the request of Dr. Katie De Guzman. Now in ICU on Vent noted to have     Postoperative Diagnosis: PERFORATED SMALL BOWEL WITH A BOWEL OBSTRUCTION   Procedure(s): EXPLORATORY LAPAROTOMY, LYSIS OF ADHESIONS AND A SMALL BOWEL RESECTION      Patient on Vent groggy but awake and following commands. Mild pain. Is on Diprovan drip and IVF. Review of Systems  Limited since on Vent -- as per above    Patient Active Problem List   Diagnosis Code    Generalized abdominal pain R10.84    Intractable cyclical vomiting with nausea G43. A1    SBO (small bowel obstruction) ([facility]) K56.69    Musculoskeletal disorder M79.9    Morbid obesity ([facility]) E66.01    Contact dermatitis and other eczema, due to unspecified cause L25.9    Bowel obstruction ([facility]) K56.60    PAM on CPAP G47.33, Z99.89    Small bowel perforation ([address] K63.1    Encounter for weaning from ventilator [facility]) Z99.11            Prior to Admission Medications   Prescriptions Last Dose Informant Patient Reported? Taking?   fluticasone (FLONASE) 50 mcg/actuation nasal spray 5/10/2017 at Unknown time  Yes Yes   Si Sprays by Both Nostrils route nightly.  Indications: ALLERGIC RHINITIS      Facility-Administered Medications: None       Past Medical History:   Diagnosis Date    Calculus of kidney     x 3    Contact dermatitis and other eczema, due to unspecified cause     Intractable cyclical vomiting with nausea 2017    Morbid obesity ([address]     Musculoskeletal disorder     Sleep apnea     c-pap     Past Surgical History:   Procedure Laterality Date    HX APPENDECTOMY      HX COLONOSCOPY      HX HEENT      uvulectomt,t&A    HX ORTHOPAEDIC      neck , spinal fusion,rt knee artho    HX OTHER SURGICAL 2009    lap band     Social History     Social History    Marital status:      Spouse name: N/A    Number of children: N/A    Years of education: N/A     Occupational History    Not on file.      Social History Main Topics    Smoking status: Never Smoker    Smokeless tobacco: Not on file    Alcohol use Yes      Comment: occ    Drug use: Not on file    Sexual activity: Not on file     Other Topics Concern    Not on file     Social History Narrative     Family History   Problem Relation Age of Onset    Heart Disease Mother     Heart Disease Father      Allergies   Allergen Reactions    Penicillins Rash     Doesn't exactly remember, had it as a child       Current Facility-Administered Medications   Medication Dose Route Frequency    piperacillin-tazobactam (ZOSYN) 4.5 g in 0.9% sodium chloride (MBP/ADV) 100 mL  4.5 g IntraVENous Q8H    vancomycin (VANCOCIN) 2000 mg in  ml infusion  2,000 mg IntraVENous Q12H    0.9% sodium chloride with KCl 20 mEq/L infusion  100 mL/hr IntraVENous CONTINUOUS    sodium chloride (NS) flush 5-10 mL  5-10 mL IntraVENous Q8H    sodium chloride (NS) flush 5-10 mL  5-10 mL IntraVENous PRN    acetaminophen (OFIRMEV) infusion 1,000 mg  1,000 mg IntraVENous Q6H    naloxone (NARCAN) injection 0.4 mg  0.4 mg IntraVENous PRN    pantoprazole (PROTONIX) 40 mg in sodium chloride 0.9 % 10 mL injection  40 mg IntraVENous Q24H    diphenhydrAMINE (BENADRYL) injection 12.5 mg  12.5 mg IntraVENous Q4H PRN    LORazepam (ATIVAN) injection 1 mg  1 mg IntraVENous Q6H PRN    dextrose 40% (GLUTOSE) oral gel 1 Tube  1 Tube Oral PRN    glucagon (GLUCAGEN) injection 1 mg  1 mg IntraMUSCular PRN    dextrose (D50W) injection syrg 12.5-25 g  25-50 mL IntraVENous PRN    ondansetron (ZOFRAN) injection 4 mg  4 mg IntraVENous Q6H PRN    HYDROmorphone (PF) (DILAUDID) injection 0.5 mg  0.5 mg IntraVENous Q1H PRN    HYDROmorphone (PF) (DILAUDID) injection 1 mg  1 mg IntraVENous Q1H PRN    enalaprilat (VASOTEC) injection 1.25 mg  1.25 mg IntraVENous Q4H PRN    metoprolol (LOPRESSOR) injection 1.25 mg  1.25 mg IntraVENous Q4H PRN    sodium chloride 0.9 % bolus infusion 1,000 mL  1,000 mL IntraVENous PRN    [START ON 5/12/2017] enoxaparin (LOVENOX) injection 40 mg  40 mg SubCUTAneous Q12H    propofol (DIPRIVAN) infusion  5-50 mcg/kg/min IntraVENous TITRATE    fentaNYL in normal saline (pf) 25 mcg/mL infusion   mcg/hr IntraVENous TITRATE         Objective:     Vitals:    05/11/17 0800 05/11/17 0830 05/11/17 1128 05/11/17 1129   BP: (!) 137/93 (!) 132/100  (!) 142/97   Pulse: (!) 113 (!) 108 92 93   Resp: 23 23 14 16   Temp:    97.4 °F (36.3 °C)   SpO2: 95% 95% 96% 96%   Weight:       Height:           PHYSICAL EXAM     Constitutional:  the patient is well developed and in no acute distress  EENMT:  Sclera clear, pupils equal, oral mucosa moist  Respiratory: CTA b/l. No wheezing  Cardiovascular:  RRR without M,G,R  Gastrointestinal: soft and non-tender; with no bowel sounds. Musculoskeletal: warm without cyanosis. There is no lower leg edema. Skin:  no jaundice or rashes, abdominal wounds with no drains  Neurologic: no gross neuro deficits     Psychiatric:  Groggy but arousalbe and following commands and answering questions. Chest X-ray:   ETT in place with ?  Atelectasis vs infiltrates          Recent Labs      05/11/17   0430   WBC  5.9   HGB  15.8   HCT  45.5   PLT  214     Recent Labs      05/11/17   0430   NA  138   K  4.1   CL  101   GLU  166*   CO2  27   BUN  26*   CREA  1.13   CA  9.4   ALB  2.9*   TBILI  0.8   ALT  35   SGOT  19     Recent Labs      05/11/17   0441   PH  7.48*   PCO2  35   PO2  59*   HCO3  26     Recent Labs      05/11/17   0435   LAC  1.5       Assessment:  (Medical Decision Making)     Hospital Problems  Date Reviewed: 5/11/2017          Codes Class Noted POA    * (Principal)Generalized abdominal pain ICD-10-CM: R10.84  ICD-9-CM: 789.07  5/11/2017 Yes Intractable cyclical vomiting with nausea ICD-10-CM: G43. A1  ICD-9-CM: 536.2  5/11/2017 Yes        SBO (small bowel obstruction) ([facility] ICD-10-CM: K56.69  ICD-9-CM: 560.9  5/11/2017 Yes        Small bowel perforation ([facility] ICD-10-CM: K63.1  ICD-9-CM: 569.83  5/11/2017 Unknown        PAM on CPAP ICD-10-CM: G47.33, Z99.89  ICD-9-CM: 327.23, V46.8  5/11/2017 Unknown        Encounter for weaning from ventilator [facility]) ICD-10-CM: Z99.11  ICD-9-CM: V46.13  5/11/2017 Unknown        Musculoskeletal disorder ICD-10-CM: M79.9  ICD-9-CM: 729.90  Unknown Yes        Morbid obesity ([facility] ICD-10-CM: E66.01  ICD-9-CM: 278.01  Unknown Yes        Contact dermatitis and other eczema, due to unspecified cause ICD-10-CM: L25.9  ICD-9-CM: 692.9  Unknown Yes        Bowel obstruction ([facility] ICD-10-CM: K56.60  ICD-9-CM: 560.9  5/11/2017 Unknown              Plan:  (Medical Decision Making)     -- add fentanyl drip since having pain and taper diprivan as tolerated, keep vazquez 2  -- continue vent -- ETT in place on x-ray and mild atelectasis. ABG noted. Keep on vent till tomorrow per surgery  -- new labs tomorrow  -- abdominal management per surgery  --patient reports does have PAM and uses CPAP -- told nurse to tell his family to bring it in.   --continue remaining treatment. More than 50% of the time documented was spent in face-to-face contact with the patient and in the care of the patient on the floor/unit where the patient is located. Thank you very much for this referral.  We appreciate the opportunity to participate in this patient's care. Will follow along with above stated plan.     Gonzalez Nunes MD

## 2017-05-11 NOTE — PROGRESS NOTES
TRANSFER - OUT REPORT:    Verbal report given to KENYA De La O on [employer]  being transferred to OR for routine progression of care       Report consisted of patients Situation, Background, Assessment and   Recommendations(SBAR). Information from the following report(s) SBAR, Kardex, Procedure Summary, Intake/Output, MAR and Cardiac Rhythm NSR was reviewed with the receiving nurse. Lines:   Peripheral IV Left Antecubital (Active)   Site Assessment Clean, dry, & intact 5/11/2017  3:31 AM   Phlebitis Assessment 0 5/11/2017  3:31 AM   Infiltration Assessment 0 5/11/2017  3:31 AM   Dressing Status Clean, dry, & intact 5/11/2017  3:31 AM   Dressing Type Tape;Transparent 5/11/2017  3:31 AM   Hub Color/Line Status Capped 5/11/2017  3:31 AM   Alcohol Cap Used No 5/11/2017  3:31 AM       Peripheral IV 05/11/17 Left Wrist (Active)   Site Assessment Clean, dry, & intact 5/11/2017  6:04 AM   Phlebitis Assessment 0 5/11/2017  6:04 AM   Infiltration Assessment 0 5/11/2017  6:04 AM   Dressing Status Clean, dry, & intact 5/11/2017  6:04 AM   Dressing Type Tape;Transparent 5/11/2017  6:04 AM   Hub Color/Line Status Infusing 5/11/2017  6:04 AM   Alcohol Cap Used No 5/11/2017  6:04 AM        Opportunity for questions and clarification was provided.       Patient transported with:   Monitor  O2 @ 3 liters  Registered Nurse

## 2017-05-11 NOTE — BRIEF OP NOTE
BRIEF OPERATIVE NOTE    Date of Procedure: 5/11/2017   Preoperative Diagnosis: PERITONITIS AFTER RECENT SURGERY  Postoperative Diagnosis: PERFORATED SMALL BOWEL WITH A BOWEL OBSTRUCTION   Procedure(s): EXPLORATORY LAPAROTOMY, LYSIS OF ADHESIONS AND A SMALL BOWEL RESECTION  Surgeon(s) and Role:     * Malena Juan MD - Primary         Assistant Staff:       Surgical Staff:  Circ-1: Audrey Powell RN  Scrub Tech-1: Ayesha Fried  Scrub Tech-2: José Miguel Collins  Event Time In   Incision Start 1001   Incision Close      Anesthesia: General   Estimated Blood Loss: 50 cc's  Specimens:   ID Type Source Tests Collected by Time Destination   1 : SMALL BOWEL Fresh Abdomen  Malena Juan MD 5/11/2017 1057 Pathology      Findings: See dictated note   Complications: None  Implants: * No implants in log *

## 2017-05-11 NOTE — PROGRESS NOTES
Pharmacokinetic Consult to Pharmacist    Mateusz Maxwell is a 47 y.o. male being treated for Sepsis, SBO  With Vancomycin  And Zosyn. S/P lap band removal by Dr Tom Gamino on 5/08/2017. Has been having progressive  Nausea with abd pain. Transferred from [place] [address] to [facility]. Height: 5' 10\" (177.8 cm)  Weight: 135.6 kg (298 lb 15.1 oz)  Lab Results   Component Value Date/Time    BUN 26 05/11/2017 04:30 AM    Creatinine 1.13 05/11/2017 04:30 AM    WBC 5.9 05/11/2017 04:30 AM      Estimated Creatinine Clearance: 104.8 mL/min (based on Cr of 1.13). CULTURES:  Blood - in process  MRSA screen - in process    Day 1 of vancomycin. Goal trough is 15 - 20 . Vancomycin dose initiated at 2000 mg ( [facility]  Started a 1 gram dose before transport - now will give another 1 gram dose and then. further dosing  Will be 2 grams IVPB every 12 hours.   Clinical staff will continue to follow patient        Thank you,  FLOYD Back

## 2017-05-11 NOTE — H&P
H&P/Consult Note/Progress Note/Office Note:   Tara Vásquez  MRN: [id number]  :1965  Age:52 y.o.    HPI: Tara Váqsuez is a 47 y.o. male who is s/p lap band removal by Dr Luzma Coronado on 17. The patient reports it was originally placed in approx  in [place], [place] and it wasn't working (no net weight loss; BMI 42.9) and was thought to possibly be giving him some lower chest pain after cardiac workup was unremarkable. UGI showed resistance to passage of a barium tablet and reflux with esophageal spasm. He reports he went home the day of surgery and did well initially. He reports he had a lot of problems with progressive nausea and dry heaves and this became accompanied by increasing abd pain. He went to the ER at [facility] where /88, no fever, WBC 10.9, BUN/Cr 25/1.29 and non fever. He had  CT scan was performed which had indeterminate findings of some small bowel wall thickening, an area of punctate air and some mesenteric gas without portal vein gas or pneumatosis. The ER called and asked that he be transferred to [facility] as the surgeons there were not comfortable caring for him there. He was transferred to [facility]. ICU. He dernies CP or SOB.   He denies abd pain at rest.    His paperwork transferred with him does not include a CT report        Past Medical History:   Diagnosis Date    Calculus of kidney     x 3    Contact dermatitis and other eczema, due to unspecified cause     Intractable cyclical vomiting with nausea 2017    Morbid obesity ([address]     Musculoskeletal disorder     Sleep apnea     c-pap     Past Surgical History:   Procedure Laterality Date    HX APPENDECTOMY      HX COLONOSCOPY      HX HEENT      uvulectomt,t&A    HX ORTHOPAEDIC      neck , spinal fusion,rt knee artho    HX OTHER SURGICAL  2009    lap band     Current Facility-Administered Medications   Medication Dose Route Frequency    piperacillin-tazobactam (ZOSYN) 4.5 g in 0.9% sodium chloride (MBP/ADV) 100 mL  4.5 g IntraVENous Q8H    famotidine (PF) (PEPCID) injection 20 mg  20 mg IntraVENous Q12H    lactated ringers infusion  150 mL/hr IntraVENous CONTINUOUS    promethazine (PHENERGAN) with saline injection 12.5 mg  12.5 mg IntraVENous Q4H PRN     Penicillins  Social History     Social History    Marital status:      Spouse name: N/A    Number of children: N/A    Years of education: N/A     Social History Main Topics    Smoking status: Never Smoker    Smokeless tobacco: Not on file    Alcohol use Yes      Comment: occ    Drug use: Not on file    Sexual activity: Not on file     Other Topics Concern    Not on file     Social History Narrative     History   Smoking Status    Never Smoker   Smokeless Tobacco    Not on file     Family History   Problem Relation Age of Onset    Heart Disease Mother     Heart Disease Father      ROS: The patient has no difficulty with chest pain or shortness of breath. No fever or chills. Comprehensive review of systems was otherwise unremarkable except as noted above. Physical Exam:   Visit Vitals    BP (!) 156/101    Pulse (!) 108    Resp 21    SpO2 94%     Constitutional: Alert, oriented, cooperative patient in no acute distress; appears stated age    Eyes:Sclera are clear. EOMs intact  ENMT: no external lesions gross hearing normal; no obvious neck masses, no ear or lip lesions, nares normal; NGT with bilious output  CV: RRR. Normal perfusion  Resp: No JVD. Breathing is  non-labored; no audible wheezing. GI: obese, distended, tender to deeper palpation     [place]os with john urine  Musculoskeletal: unremarkable with normal function. No embolic signs or cyanosis.    Neuro:  Oriented; moves all 4; no focal deficits  Psychiatric: normal affect and mood, no memory impairment    Recent vitals (if inpt):  Patient Vitals for the past 24 hrs:   BP Pulse Resp SpO2   05/11/17 0400 (!) 156/101 (!) 108 21 94 %   05/11/17 0330 (!) 150/94 (!) 103 24 92 %       Labs:  No results for input(s): WBC, HGB, PLT, NA, K, CL, CO2, BUN, CREA, GLU, PTP, INR, APTT, TBIL, TBILI, CBIL, SGOT, GPT, ALT, AP, AML, LPSE, LCAD, NH4, TROPT, TROIQ, PCO2, PO2, HCO3, HGBEXT, PLTEXT, HGBEXT, PLTEXT in the last 72 hours. No lab exists for component:  PH, INREXT, INREXT    Lab Results   Component Value Date/Time    WBC 6.8 05/05/2017 09:40 AM    HGB 14.5 05/05/2017 09:40 AM    PLATELET 813 81/97/2167 09:40 AM    Sodium 143 05/05/2017 09:40 AM    Potassium 4.5 05/05/2017 09:40 AM    Chloride 108 05/05/2017 09:40 AM    CO2 32 05/05/2017 09:40 AM    BUN 12 05/05/2017 09:40 AM    Creatinine 0.98 05/05/2017 09:40 AM    Glucose 111 05/05/2017 09:40 AM    Bilirubin, total 0.6 05/05/2017 09:40 AM    AST (SGOT) 24 05/05/2017 09:40 AM    ALT (SGPT) 47 05/05/2017 09:40 AM    Alk. phosphatase 71 05/05/2017 09:40 AM       I reviewed recent labs and recent radiologic studies. I independently reviewed radiology images for studies I described above or studies I have ordered. Admission date (for inpatients): 5/11/2017   * No surgery found *  * No surgery found *    ASSESSMENT/PLAN:  Problem List  Date Reviewed: 5/11/2017          Codes Class Noted    Generalized abdominal pain ICD-10-CM: R10.84  ICD-9-CM: 789.07  5/11/2017        Intractable cyclical vomiting with nausea ICD-10-CM: G43. A1  ICD-9-CM: 536.2  5/11/2017        SBO (small bowel obstruction) ([facility] ICD-10-CM: K56.69  ICD-9-CM: 560.9  5/11/2017            Active Problems:    Generalized abdominal pain (5/11/2017)      Intractable cyclical vomiting with nausea (5/11/2017)      SBO (small bowel obstruction) ([address] (5/11/2017)       Mesenteric gas and small bowel wall thickening      Plan  Admit IVF hydration  Get admit labs  ABG  Lactic acid  IV Abx (Zosyn/Vanc)  Blood cultures x2   Lagos to gravity  NGT to LISx      I will take CT disc to radiology department and attempt to review.   We will repeat CT scan if needed  I have attempted to contact Dr Herrera Drafts  Possible expl lap vs CTA      Signed:  Tanya Naik MD,  FACS

## 2017-05-11 NOTE — INTERDISCIPLINARY ROUNDS
Interdisciplinary team rounds were held 5/11/2017 with the following team members:Nursing, Nurse Practitioner, Nutrition, Palliative Care, Pastoral Care, Pharmacy, Physical Therapy, Physician, Respiratory Therapy, Wound Care and Clinical Coordinator and the patient. Plan of care discussed. See clinical pathway and/or care plan for interventions and desired outcomes.

## 2017-05-11 NOTE — ANESTHESIA POSTPROCEDURE EVALUATION
Post-Anesthesia Evaluation and Assessment    Patient: Evonne Durham MRN: [id number]  SSN: [id number]    YOB: 1965  Age: 47 y.o. Sex: male       Cardiovascular Function/Vital Signs  Visit Vitals    /77    Pulse 99    Temp 36.6 °C (97.9 °F)    Resp 19    Ht 5' 10\" (1.778 m)    Wt 135.6 kg (298 lb 15.1 oz)    SpO2 97%    BMI 42.89 kg/m2       Patient is status post general anesthesia for Procedure(s):  LAPAROTOMY EXPLORATORY /SMALL BOWEL RESECTION. Nausea/Vomiting: None    Postoperative hydration reviewed and adequate. Pain:  Pain Scale 1: Adult Nonverbal Pain Scale (05/11/17 1207)  Pain Intensity 1: 7 (05/11/17 1207)   Managed    Neurological Status:   Neuro  Neurologic State: Drowsy; Eyes open to voice (05/11/17 1204)  Orientation Level: Unable to verbalize (05/11/17 1204)  Cognition: Follows commands (05/11/17 1204)  Speech: Intubated (05/11/17 1204)     Mental Status and Level of Consciousness: Sedated. Pulmonary Status:   O2 Device: Endotracheal tube;Ventilator (05/11/17 1204)   Adequate oxygenation and airway patent    Complications related to anesthesia: None    Post-anesthesia assessment completed.  No concerns    Signed By: Kristy Pinedo MD     May 11, 2017

## 2017-05-11 NOTE — PROGRESS NOTES
Patient out from operating room and placed on the ventilator on documented settings. Patient is orally intubated with a # 8.0 ET Tube secured at the 22 cm pablo at the lip. Breath sounds are diminished. Trachea is midline. Negative for subcutaneous air, chest excursion is symmetric. Patient is Negative for cyanosis. All alarms are set and audible. Resuscitation bag is at the head of the bed.       Ventilator Settings  Mode FIO2 Rate Tidal Volume Pressure PEEP I:E Ratio   PRVC  60 %   14 500 ml     8 cm H20  1:3.3      Peak airway pressure: 27 cm H2O   Minute ventilation: 7.3 l/min     ABG:   Recent Labs      05/11/17   0441   PH  7.48*   PCO2  35   PO2  59*   HCO3  26         Aimee Bell, RT

## 2017-05-11 NOTE — PERIOP NOTES
TRANSFER - OUT REPORT:    Verbal report given to Vandana Michele  being transferred to ICU) for routine progression of care       Report consisted of patients Situation, Background, Assessment and   Recommendations(SBAR). Information from the following report(s) OR Summary was reviewed with the receiving nurse. Lines:   Peripheral IV Left Antecubital (Active)   Site Assessment Clean, dry, & intact 5/11/2017  7:00 AM   Phlebitis Assessment 0 5/11/2017  7:00 AM   Infiltration Assessment 0 5/11/2017  7:00 AM   Dressing Status Clean, dry, & intact 5/11/2017  7:00 AM   Dressing Type Tape;Transparent 5/11/2017  7:00 AM   Hub Color/Line Status Infusing 5/11/2017  7:00 AM   Alcohol Cap Used No 5/11/2017  7:00 AM       Peripheral IV 05/11/17 Left Wrist (Active)   Site Assessment Clean, dry, & intact 5/11/2017  7:00 AM   Phlebitis Assessment 0 5/11/2017  7:00 AM   Infiltration Assessment 0 5/11/2017  7:00 AM   Dressing Status Clean, dry, & intact 5/11/2017  7:00 AM   Dressing Type Tape;Transparent 5/11/2017  7:00 AM   Hub Color/Line Status Infusing 5/11/2017  7:00 AM   Alcohol Cap Used No 5/11/2017  7:00 AM       Peripheral IV 05/11/17 Right Hand (Active)        Opportunity for questions and clarification was provided.       Patient transported with:   Registered Nurse

## 2017-05-11 NOTE — PROGRESS NOTES
Dual Skin Assessment    Skin assessment completed with Jessica Dimas RN  See below. Scar to lower left side of back  Three incisions to abd. Steri Strips in place. No drainage, redness, or swelling noted.     Wound Abdomen Anterior;Medial;Upper (Active)       Wound Abdomen Right;Upper (Active)       Wound Abdomen Right;Medial (Active)       Wound Umbilicus (Active)       Wound Abdomen Left;Lateral (Active)        Bree Adams RN    5/11/2017 3:26 AM

## 2017-05-11 NOTE — PROGRESS NOTES
Physical Therapy Note:    Participated in interdisciplinary rounds including Cam, Wound Care, Palliative Care NP, RN staff, MD, Respiratory therapy, and Nutrition. Per RN, patient not appropriate for PT/OT at this time until mobility can be approved by [facility]. Will continue to participate in rounds to determine appropriateness of PT/OT.     Thank you,  Melvin Smith, PT, DPT

## 2017-05-11 NOTE — PROGRESS NOTES
TRANSFER - IN REPORT:    Verbal report received from KENYA De La O on Salvador Mccarty  being received from OR for routine progression of care      Report consisted of patients Situation, Background, Assessment and   Recommendations(SBAR). Information from the following report(s) OR Summary was reviewed with the receiving nurse. Opportunity for questions and clarification was provided. Assessment completed upon patients arrival to unit and care assumed.

## 2017-05-11 NOTE — ANESTHESIA PREPROCEDURE EVALUATION
Anesthetic History     PONV          Review of Systems / Medical History  Patient summary reviewed and pertinent labs reviewed    Pulmonary        Sleep apnea: CPAP           Neuro/Psych   Within defined limits           Cardiovascular                  Exercise tolerance: >4 METS  Comments: Denies CP, SOB or changes in functional status  Nml stress in 2015   GI/Hepatic/Renal         Renal disease: stones       Endo/Other        Morbid obesity     Other Findings            Physical Exam    Airway  Mallampati: III  TM Distance: 4 - 6 cm  Neck ROM: normal range of motion   Mouth opening: Normal     Cardiovascular    Rhythm: regular  Rate: normal         Dental    Dentition: Caps/crowns and Bridges     Pulmonary  Breath sounds clear to auscultation               Abdominal  GI exam deferred       Other Findings            Anesthetic Plan    ASA: 3, emergent  Anesthesia type: general          Induction: Intravenous and RSI  Anesthetic plan and risks discussed with: Patient

## 2017-05-11 NOTE — PROGRESS NOTES
General Surgery Progress Note    5/11/2017    Admit Date: 5/11/2017    Subjective:     Surgery  Patient admitted by Dr. Bettie Wells last night as a transfer from [place] [address]. CT scan shows a small bowel obstruction with air in the mesentery. He had a Lap-Band removal done on 5/8/17 with lysis of adhesions as there was small bowel adherent to the peritoneum in several areas, notably where the port for the Lap-Band system was located. The patient is tachycardic, oliguric and has peritoneal signs. He likely has a bowel injury which needs urgent repair. Objective:     Visit Vitals    /87    Pulse (!) 112    Resp 22    Ht 5' 10\" (1.778 m)    Wt 298 lb 15.1 oz (135.6 kg)    SpO2 92%    BMI 42.89 kg/m2         Intake/Output Summary (Last 24 hours) at 05/11/17 0849  Last data filed at 05/11/17 0547   Gross per 24 hour   Intake                0 ml   Output             1750 ml   Net            -1750 ml        EXAM:  ABD distended, diffuse tenderness with rebound and guarding.         Data Review    Recent Results (from the past 24 hour(s))   MRSA SCREEN - PCR (NASAL)    Collection Time: 05/11/17  3:30 AM   Result Value Ref Range    Special Requests: NO SPECIAL REQUESTS      Culture result:        MRSA target DNA is not detected (presumptive not colonized with MRSA)   CBC W/O DIFF    Collection Time: 05/11/17  4:30 AM   Result Value Ref Range    WBC 5.9 4.3 - 11.1 K/uL    RBC 5.28 4.23 - 5.67 M/uL    HGB 15.8 13.6 - 17.2 g/dL    HCT 45.5 41.1 - 50.3 %    MCV 86.2 79.6 - 97.8 FL    MCH 29.9 26.1 - 32.9 PG    MCHC 34.7 31.4 - 35.0 g/dL    RDW 14.3 11.9 - 14.6 %    PLATELET 329 811 - 648 K/uL    MPV 9.5 (L) 10.8 - 11.2 FL   METABOLIC PANEL, COMPREHENSIVE    Collection Time: 05/11/17  4:30 AM   Result Value Ref Range    Sodium 138 136 - 145 mmol/L    Potassium 4.1 3.5 - 5.1 mmol/L    Chloride 101 98 - 107 mmol/L    CO2 27 21 - 32 mmol/L    Anion gap 10 7 - 16 mmol/L    Glucose 166 (H) 65 - 100 mg/dL    BUN 26 (H) 6 - 23 MG/DL    Creatinine 1.13 0.8 - 1.5 MG/DL    GFR est AA >60 >60 ml/min/1.73m2    GFR est non-AA >60 >60 ml/min/1.73m2    Calcium 9.4 8.3 - 10.4 MG/DL    Bilirubin, total 0.8 0.2 - 1.1 MG/DL    ALT (SGPT) 35 12 - 65 U/L    AST (SGOT) 19 15 - 37 U/L    Alk. phosphatase 56 50 - 136 U/L    Protein, total 7.4 6.3 - 8.2 g/dL    Albumin 2.9 (L) 3.5 - 5.0 g/dL    Globulin 4.5 (H) 2.3 - 3.5 g/dL    A-G Ratio 0.6 (L) 1.2 - 3.5     LACTIC ACID, PLASMA    Collection Time: 05/11/17  4:35 AM   Result Value Ref Range    Lactic acid 1.5 0.4 - 2.0 MMOL/L   BLOOD GAS & LYTES, ARTERIAL    Collection Time: 05/11/17  4:41 AM   Result Value Ref Range    pH 7.48 (H) 7.35 - 7.45      PCO2 35 35.0 - 45.0 mmHg    PO2 59 (L) 75.0 - 100.0 mmHg    BICARBONATE 26 22.0 - 26.0 mmol/L    BASE EXCESS 2.6 0 - 3 mmol/L    TOTAL HEMOGLOBIN 16.9 (H) 11.7 - 15.0 GM/DL    O2 SAT 91 (L) 92.0 - 98.5 %    ARTERIAL O2 HGB 89.7 (L) 94.0 - 97.0 %    CARBOXYHEMOGLOBIN 0.9 0.5 - 1.5 %    METHEMOGLOBIN 0.7 0.0 - 1.5 %    DEOXYHEMOGLOBIN 9 (H) 0.0 - 5.0 %    Sodium 133.2 (L) 135 - 148 MMOL/L    POTASSIUM 4.16 3.5 - 5.3 MMOL/L    CHLORIDE 99 98 - 106      Calcium, ionized 1.18 1.0 - 1.3 mmol/L    SITE RB     ALLENS TEST NA     MODE RA     FIO2 21.0 %    Respiratory comment: KENYA Gerber at 5 11 2017 4 45 01 AM. Read back. CULTURE, BLOOD    Collection Time: 05/11/17  6:01 AM   Result Value Ref Range    Special Requests: RIGHT HAND      Culture result: PENDING    CULTURE, BLOOD    Collection Time: 05/11/17  6:10 AM   Result Value Ref Range    Special Requests: RIGHT HAND      Culture result: PENDING         Hospital Problems  Date Reviewed: 5/11/2017          Codes Class Noted POA    Generalized abdominal pain ICD-10-CM: R10.84  ICD-9-CM: 789.07  5/11/2017 Yes        Intractable cyclical vomiting with nausea ICD-10-CM: G43. A1  ICD-9-CM: 536.2  5/11/2017 Yes        SBO (small bowel obstruction) ([facility] ICD-10-CM: K56.69  ICD-9-CM: 560.9  5/11/2017 Yes Musculoskeletal disorder ICD-10-CM: M79.9  ICD-9-CM: 729.90  Unknown Yes        Morbid obesity ([facility] ICD-10-CM: E66.01  ICD-9-CM: 278.01  Unknown Yes        Contact dermatitis and other eczema, due to unspecified cause ICD-10-CM: L25.9  ICD-9-CM: 692.9  Unknown Yes        Bowel obstruction ([facility] ICD-10-CM: K56.60  ICD-9-CM: 560.9  5/11/2017 Unknown            1. OR for exploratory laparotomy ASAP. 2. Antibiotics. 3. ICU care after surgery.   Dayna Landry MD.

## 2017-05-11 NOTE — PROGRESS NOTES
TRANSFER - IN REPORT:    Verbal report received from [facility] on Alyssa Caballero  being received from [facility] ED for routine progression of care      Report consisted of patients Situation, Background, Assessment and   Recommendations(SBAR). Information from the following report(s) SBAR, Kardex, ED Summary, Intake/Output, MAR and Cardiac Rhythm ST was reviewed with the receiving nurse. Opportunity for questions and clarification was provided. Assessment completed upon patients arrival to unit and care assumed.

## 2017-05-12 ENCOUNTER — APPOINTMENT (OUTPATIENT)
Dept: GENERAL RADIOLOGY | Age: 52
DRG: 329 | End: 2017-05-12
Attending: INTERNAL MEDICINE
Payer: SELF-PAY

## 2017-05-12 ENCOUNTER — DOCUMENTATION ONLY (OUTPATIENT)
Dept: RESPIRATORY THERAPY | Age: 52
End: 2017-05-12

## 2017-05-12 LAB
ANION GAP BLD CALC-SCNC: 8 MMOL/L (ref 7–16)
ARTERIAL PATENCY WRIST A: POSITIVE
BASE DEFICIT BLDA-SCNC: 0.4 MMOL/L (ref 0–2)
BASOPHILS # BLD AUTO: 0 K/UL (ref 0–0.2)
BASOPHILS # BLD: 0 % (ref 0–2)
BDY SITE: ABNORMAL
BUN SERPL-MCNC: 22 MG/DL (ref 6–23)
CALCIUM SERPL-MCNC: 8.4 MG/DL (ref 8.3–10.4)
CHLORIDE SERPL-SCNC: 107 MMOL/L (ref 98–107)
CO2 SERPL-SCNC: 27 MMOL/L (ref 21–32)
COHGB MFR BLD: 0.8 % (ref 0.5–1.5)
CREAT SERPL-MCNC: 1.02 MG/DL (ref 0.8–1.5)
DIFFERENTIAL METHOD BLD: ABNORMAL
DO-HGB BLD-MCNC: 5 % (ref 0–5)
EOSINOPHIL # BLD: 0 K/UL (ref 0–0.8)
EOSINOPHIL NFR BLD: 0 % (ref 0.5–7.8)
ERYTHROCYTE [DISTWIDTH] IN BLOOD BY AUTOMATED COUNT: 14.9 % (ref 11.9–14.6)
FIO2 ON VENT: 30 %
GLUCOSE SERPL-MCNC: 152 MG/DL (ref 65–100)
HCO3 BLDA-SCNC: 24 MMOL/L (ref 22–26)
HCT VFR BLD AUTO: 39.9 % (ref 41.1–50.3)
HGB BLD-MCNC: 13.5 G/DL (ref 13.6–17.2)
HGB BLDMV-MCNC: 14.5 GM/DL (ref 11.7–15)
IMM GRANULOCYTES # BLD: 0 K/UL (ref 0–0.5)
IMM GRANULOCYTES NFR BLD AUTO: 0.2 % (ref 0–5)
LYMPHOCYTES # BLD AUTO: 10 % (ref 13–44)
LYMPHOCYTES # BLD: 0.6 K/UL (ref 0.5–4.6)
MAGNESIUM SERPL-MCNC: 2.2 MG/DL (ref 1.8–2.4)
MCH RBC QN AUTO: 29.9 PG (ref 26.1–32.9)
MCHC RBC AUTO-ENTMCNC: 33.8 G/DL (ref 31.4–35)
MCV RBC AUTO: 88.3 FL (ref 79.6–97.8)
METHGB MFR BLD: 0.8 % (ref 0–1.5)
MONOCYTES # BLD: 1 K/UL (ref 0.1–1.3)
MONOCYTES NFR BLD AUTO: 18 % (ref 4–12)
NEUTS SEG # BLD: 4.1 K/UL (ref 1.7–8.2)
NEUTS SEG NFR BLD AUTO: 72 % (ref 43–78)
OXYHGB MFR BLDA: 93 % (ref 94–97)
PCO2 BLDA: 40 MMHG (ref 35–45)
PEEP RESPIRATORY: 8 CM[H2O]
PH BLDA: 7.41 [PH] (ref 7.35–7.45)
PHOSPHATE SERPL-MCNC: 2.4 MG/DL (ref 2.5–4.5)
PLATELET # BLD AUTO: 210 K/UL (ref 150–450)
PMV BLD AUTO: 9.9 FL (ref 10.8–14.1)
PO2 BLDA: 73 MMHG (ref 75–100)
POTASSIUM SERPL-SCNC: 4.6 MMOL/L (ref 3.5–5.1)
RBC # BLD AUTO: 4.52 M/UL (ref 4.23–5.67)
SAO2 % BLD: 95 % (ref 92–98.5)
SODIUM SERPL-SCNC: 142 MMOL/L (ref 136–145)
VANCOMYCIN TROUGH SERPL-MCNC: 9.8 UG/ML (ref 5–20)
VENTILATION MODE VENT: ABNORMAL
WBC # BLD AUTO: 5.7 K/UL (ref 4.3–11.1)

## 2017-05-12 PROCEDURE — 83735 ASSAY OF MAGNESIUM: CPT | Performed by: INTERNAL MEDICINE

## 2017-05-12 PROCEDURE — 99233 SBSQ HOSP IP/OBS HIGH 50: CPT | Performed by: INTERNAL MEDICINE

## 2017-05-12 PROCEDURE — 74011000250 HC RX REV CODE- 250: Performed by: SURGERY

## 2017-05-12 PROCEDURE — 74011000250 HC RX REV CODE- 250: Performed by: INTERNAL MEDICINE

## 2017-05-12 PROCEDURE — 74011250636 HC RX REV CODE- 250/636: Performed by: SURGERY

## 2017-05-12 PROCEDURE — 84100 ASSAY OF PHOSPHORUS: CPT | Performed by: INTERNAL MEDICINE

## 2017-05-12 PROCEDURE — 71010 XR CHEST PORT: CPT

## 2017-05-12 PROCEDURE — 65270000029 HC RM PRIVATE

## 2017-05-12 PROCEDURE — 85025 COMPLETE CBC W/AUTO DIFF WBC: CPT | Performed by: INTERNAL MEDICINE

## 2017-05-12 PROCEDURE — 77030021668 HC NEB PREFIL KT VYRM -A

## 2017-05-12 PROCEDURE — 80202 ASSAY OF VANCOMYCIN: CPT | Performed by: INTERNAL MEDICINE

## 2017-05-12 PROCEDURE — 82803 BLOOD GASES ANY COMBINATION: CPT

## 2017-05-12 PROCEDURE — C9113 INJ PANTOPRAZOLE SODIUM, VIA: HCPCS | Performed by: SURGERY

## 2017-05-12 PROCEDURE — 36415 COLL VENOUS BLD VENIPUNCTURE: CPT | Performed by: INTERNAL MEDICINE

## 2017-05-12 PROCEDURE — 74011250636 HC RX REV CODE- 250/636: Performed by: INTERNAL MEDICINE

## 2017-05-12 PROCEDURE — 36600 WITHDRAWAL OF ARTERIAL BLOOD: CPT

## 2017-05-12 PROCEDURE — 80048 BASIC METABOLIC PNL TOTAL CA: CPT | Performed by: INTERNAL MEDICINE

## 2017-05-12 PROCEDURE — 77030031476 HC EXCH HEAT MOISTW FLTR HALY -A

## 2017-05-12 PROCEDURE — 94003 VENT MGMT INPAT SUBQ DAY: CPT

## 2017-05-12 PROCEDURE — 74011000258 HC RX REV CODE- 258: Performed by: SURGERY

## 2017-05-12 RX ORDER — POLYVINYL ALCOHOL 14 MG/ML
1 SOLUTION/ DROPS OPHTHALMIC AS NEEDED
Status: DISCONTINUED | OUTPATIENT
Start: 2017-05-12 | End: 2017-05-15 | Stop reason: HOSPADM

## 2017-05-12 RX ORDER — METOCLOPRAMIDE HYDROCHLORIDE 5 MG/ML
10 INJECTION INTRAMUSCULAR; INTRAVENOUS EVERY 6 HOURS
Status: DISCONTINUED | OUTPATIENT
Start: 2017-05-12 | End: 2017-05-15 | Stop reason: HOSPADM

## 2017-05-12 RX ADMIN — Medication 10 ML: at 21:24

## 2017-05-12 RX ADMIN — HYDROMORPHONE HYDROCHLORIDE 1 MG: 1 INJECTION, SOLUTION INTRAMUSCULAR; INTRAVENOUS; SUBCUTANEOUS at 20:21

## 2017-05-12 RX ADMIN — PIPERACILLIN, TAZOBACTAM 4.5 G: 4; .5 INJECTION, POWDER, LYOPHILIZED, FOR SOLUTION INTRAVENOUS at 03:57

## 2017-05-12 RX ADMIN — VANCOMYCIN HYDROCHLORIDE 2000 MG: 10 INJECTION, POWDER, LYOPHILIZED, FOR SOLUTION INTRAVENOUS at 09:14

## 2017-05-12 RX ADMIN — Medication 10 ML: at 13:25

## 2017-05-12 RX ADMIN — POLYVINYL ALCOHOL 1 DROP: 14 SOLUTION/ DROPS OPHTHALMIC at 13:25

## 2017-05-12 RX ADMIN — LORAZEPAM 1 MG: 2 INJECTION INTRAMUSCULAR; INTRAVENOUS at 03:57

## 2017-05-12 RX ADMIN — ENOXAPARIN SODIUM 40 MG: 40 INJECTION SUBCUTANEOUS at 17:34

## 2017-05-12 RX ADMIN — PIPERACILLIN, TAZOBACTAM 4.5 G: 4; .5 INJECTION, POWDER, LYOPHILIZED, FOR SOLUTION INTRAVENOUS at 12:05

## 2017-05-12 RX ADMIN — VANCOMYCIN HYDROCHLORIDE 2000 MG: 10 INJECTION, POWDER, LYOPHILIZED, FOR SOLUTION INTRAVENOUS at 21:24

## 2017-05-12 RX ADMIN — ENOXAPARIN SODIUM 40 MG: 40 INJECTION SUBCUTANEOUS at 05:53

## 2017-05-12 RX ADMIN — PROPOFOL 10 MCG/KG/MIN: 10 INJECTION, EMULSION INTRAVENOUS at 00:02

## 2017-05-12 RX ADMIN — SODIUM CHLORIDE AND POTASSIUM CHLORIDE 100 ML/HR: 9; 1.49 INJECTION, SOLUTION INTRAVENOUS at 20:23

## 2017-05-12 RX ADMIN — SODIUM CHLORIDE AND POTASSIUM CHLORIDE 100 ML/HR: 9; 1.49 INJECTION, SOLUTION INTRAVENOUS at 09:11

## 2017-05-12 RX ADMIN — METOCLOPRAMIDE 10 MG: 5 INJECTION, SOLUTION INTRAMUSCULAR; INTRAVENOUS at 17:34

## 2017-05-12 RX ADMIN — Medication 10 ML: at 05:53

## 2017-05-12 RX ADMIN — PIPERACILLIN, TAZOBACTAM 4.5 G: 4; .5 INJECTION, POWDER, LYOPHILIZED, FOR SOLUTION INTRAVENOUS at 20:09

## 2017-05-12 RX ADMIN — HYDROMORPHONE HYDROCHLORIDE 0.5 MG: 1 INJECTION, SOLUTION INTRAMUSCULAR; INTRAVENOUS; SUBCUTANEOUS at 16:12

## 2017-05-12 RX ADMIN — METOCLOPRAMIDE 10 MG: 5 INJECTION, SOLUTION INTRAMUSCULAR; INTRAVENOUS at 13:25

## 2017-05-12 RX ADMIN — SODIUM CHLORIDE 40 MG: 9 INJECTION INTRAMUSCULAR; INTRAVENOUS; SUBCUTANEOUS at 12:05

## 2017-05-12 RX ADMIN — ACETAMINOPHEN 1000 MG: 10 INJECTION, SOLUTION INTRAVENOUS at 00:00

## 2017-05-12 NOTE — PROGRESS NOTES
Patient c/o abdominal pain. Administered dilaudid 0.5 mg IVP for pain. Will continue to monitor closely.

## 2017-05-12 NOTE — PROGRESS NOTES
Ventilator check complete; patient has a #8. 0 ET tube secured at the 23 at the lip. Patient is not sedated. Patient is able to follow commands. Breath sounds are diminished. Trachea is midline, Negative for subcutaneous air, and chest excursion is symmetric. Patient is also Negative for cyanosis and is Negative for pitting edema. All alarms are set and audible. Resuscitation bag is at the head of the bed.       Ventilator Settings  Mode FIO2 Rate Tidal Volume Pressure PEEP I:E Ratio   Pressure support  30 %    500 ml  10 cm H2O  8 cm H20  1:3.3      Peak airway pressure: 19 cm H2O   Minute ventilation: 7.5 l/min     ABG:   Recent Labs      05/11/17   1840  05/11/17   0441   PH  7.39  7.48*   PCO2  43  35   PO2  81  59*   HCO3  26  26         Xi Finn, RT

## 2017-05-12 NOTE — PROGRESS NOTES
General Surgery Progress Note    5/12/2017    Admit Date: 5/11/2017    Subjective:     Surgery POD #1  Patient awake, alert and able to follow instructions. He remains intubated, but is hemodynamically stable. Urine is much less concentrated. Objective:     Visit Vitals    /71    Pulse 70    Temp 98.5 °F (36.9 °C)    Resp 14    Ht 5' 10\" (1.778 m)    Wt 298 lb 15.1 oz (135.6 kg)    SpO2 100%    BMI 42.89 kg/m2         Intake/Output Summary (Last 24 hours) at 05/12/17 1018  Last data filed at 05/12/17 0714   Gross per 24 hour   Intake           3970.7 ml   Output             2650 ml   Net           1320.7 ml        EXAM:  ABD soft, moderate distention, active BS's. No flatus.        Data Review    Recent Results (from the past 24 hour(s))   BLOOD GAS, ARTERIAL    Collection Time: 05/11/17  6:40 PM   Result Value Ref Range    pH 7.39 7.35 - 7.45      PCO2 43 35.0 - 45.0 mmHg    PO2 81 75.0 - 100.0 mmHg    BICARBONATE 26 22.0 - 26.0 mmol/L    BASE EXCESS 0.4 0 - 3 mmol/L    TOTAL HEMOGLOBIN 15.3 (H) 11.7 - 15.0 GM/DL    O2 SAT 96 92.0 - 98.5 %    ARTERIAL O2 HGB 94.3 94.0 - 97.0 %    CARBOXYHEMOGLOBIN 0.9 0.5 - 1.5 %    METHEMOGLOBIN 0.9 0.0 - 1.5 %    DEOXYHEMOGLOBIN 4 0.0 - 5.0 %    SITE LR     ALLENS TEST POSITIVE      MODE PS +10     PEEP/CPAP 8.0     BLOOD GAS, ARTERIAL    Collection Time: 05/12/17  3:25 AM   Result Value Ref Range    pH 7.41 7.35 - 7.45      PCO2 40 35.0 - 45.0 mmHg    PO2 73 (L) 75.0 - 100.0 mmHg    BICARBONATE 24 22.0 - 26.0 mmol/L    BASE DEFICIT 0.4 0 - 2 mmol/L    TOTAL HEMOGLOBIN 14.5 11.7 - 15.0 GM/DL    O2 SAT 95 92.0 - 98.5 %    ARTERIAL O2 HGB 93.0 (L) 94.0 - 97.0 %    CARBOXYHEMOGLOBIN 0.8 0.5 - 1.5 %    METHEMOGLOBIN 0.8 0.0 - 1.5 %    DEOXYHEMOGLOBIN 5 0.0 - 5.0 %    SITE LR     ALLENS TEST POSITIVE      MODE Pressure Support (PS 10)     FIO2 30.0 %    PEEP/CPAP 8.0     CBC WITH AUTOMATED DIFF    Collection Time: 05/12/17  5:00 AM   Result Value Ref Range    WBC 5.7 4.3 - 11.1 K/uL    RBC 4.52 4.23 - 5.67 M/uL    HGB 13.5 (L) 13.6 - 17.2 g/dL    HCT 39.9 (L) 41.1 - 50.3 %    MCV 88.3 79.6 - 97.8 FL    MCH 29.9 26.1 - 32.9 PG    MCHC 33.8 31.4 - 35.0 g/dL    RDW 14.9 (H) 11.9 - 14.6 %    PLATELET 089 581 - 587 K/uL    MPV 9.9 (L) 10.8 - 14.1 FL    DF AUTOMATED      NEUTROPHILS 72 43 - 78 %    LYMPHOCYTES 10 (L) 13 - 44 %    MONOCYTES 18 (H) 4.0 - 12.0 %    EOSINOPHILS 0 (L) 0.5 - 7.8 %    BASOPHILS 0 0.0 - 2.0 %    IMMATURE GRANULOCYTES 0.2 0.0 - 5.0 %    ABS. NEUTROPHILS 4.1 1.7 - 8.2 K/UL    ABS. LYMPHOCYTES 0.6 0.5 - 4.6 K/UL    ABS. MONOCYTES 1.0 0.1 - 1.3 K/UL    ABS. EOSINOPHILS 0.0 0.0 - 0.8 K/UL    ABS. BASOPHILS 0.0 0.0 - 0.2 K/UL    ABS. IMM. GRANS. 0.0 0.0 - 0.5 K/UL   METABOLIC PANEL, BASIC    Collection Time: 05/12/17  5:00 AM   Result Value Ref Range    Sodium 142 136 - 145 mmol/L    Potassium 4.6 3.5 - 5.1 mmol/L    Chloride 107 98 - 107 mmol/L    CO2 27 21 - 32 mmol/L    Anion gap 8 7 - 16 mmol/L    Glucose 152 (H) 65 - 100 mg/dL    BUN 22 6 - 23 MG/DL    Creatinine 1.02 0.8 - 1.5 MG/DL    GFR est AA >60 >60 ml/min/1.73m2    GFR est non-AA >60 >60 ml/min/1.73m2    Calcium 8.4 8.3 - 10.4 MG/DL   MAGNESIUM    Collection Time: 05/12/17  5:00 AM   Result Value Ref Range    Magnesium 2.2 1.8 - 2.4 mg/dL   PHOSPHORUS    Collection Time: 05/12/17  5:00 AM   Result Value Ref Range    Phosphorus 2.4 (L) 2.5 - 4.5 MG/DL        Hospital Problems  Date Reviewed: 5/11/2017          Codes Class Noted POA    * (Principal)Generalized abdominal pain ICD-10-CM: R10.84  ICD-9-CM: 789.07  5/11/2017 Yes        Intractable cyclical vomiting with nausea ICD-10-CM: G43. A1  ICD-9-CM: 536.2  5/11/2017 Yes        SBO (small bowel obstruction) ([facility] ICD-10-CM: K56.69  ICD-9-CM: 560.9  5/11/2017 Yes        Musculoskeletal disorder ICD-10-CM: M79.9  ICD-9-CM: 729.90  Unknown Yes        Morbid obesity ([facility] ICD-10-CM: E66.01  ICD-9-CM: 278.01  Unknown Yes        Contact dermatitis and other eczema, due to unspecified cause ICD-10-CM: L25.9  ICD-9-CM: 692.9  Unknown Yes        Bowel obstruction ([facility] ICD-10-CM: K56.60  ICD-9-CM: 560.9  5/11/2017 Unknown        PAM on CPAP ICD-10-CM: G47.33, Z99.89  ICD-9-CM: 327.23, V46.8  5/11/2017 Unknown        Small bowel perforation ([facility] ICD-10-CM: K63.1  ICD-9-CM: 569.83  5/11/2017 Unknown        Encounter for weaning from ventilator [facility]) ICD-10-CM: Z99.11  ICD-9-CM: V46.13  5/11/2017 Unknown          1. Reglan  2. IVF'S  3. NG-tube decompression  4. Extubation. 5. Transfer to floor.   William Allen MD.

## 2017-05-12 NOTE — PROGRESS NOTES
Patient c/o feeling nauseous and stated that pain subsided after pain meds given. Administered Zofran 4 mg IVP. VSS, no distress noted. Will continue to monitor closely.

## 2017-05-12 NOTE — PROGRESS NOTES
TRANSFER - IN REPORT:    Verbal report received from  Tate Craig RN on Christine Shone  being received from  ICU for routine progression of care      Report consisted of patients Situation, Background, Assessment and   Recommendations(SBAR). Information from the following report(s) SBAR, OR Summary, Procedure Summary, Intake/Output and Recent Results was reviewed with the receiving nurse. Opportunity for questions and clarification was provided. Assessment completed upon patients arrival to unit and care assumed.

## 2017-05-12 NOTE — PROGRESS NOTES
Bedside, Verbal and Written shift change report given to Aruna Santos RN & Brendan Rodriguez RN (oncoming nurse) by Leonela Longoria RN (offgoing nurse). Report included the following information SBAR, Kardex, ED Summary, OR Summary, Procedure Summary, Intake/Output, MAR, Accordion, Recent Results, Med Rec Status and Cardiac Rhythm NSR. Dually verified Fentanyl drip running at 25 mcg/hr.

## 2017-05-12 NOTE — PROGRESS NOTES
TRANSFER - OUT REPORT:    Verbal report given to Lance Smith RN(name) on Theador Mushtaq  being transferred to 2nd floor(unit) for routine progression of care       Report consisted of patients Situation, Background, Assessment and   Recommendations(SBAR). Information from the following report(s) SBAR, Kardex, ED Summary, Intake/Output, MAR and Cardiac Rhythm NSR was reviewed with the receiving nurse. Opportunity for questions and clarification was provided.       Patient transported with:   O2 @ 4 liters

## 2017-05-12 NOTE — PROGRESS NOTES
Respiratory Mechanics completed and are as follows:  Weaning Parameters  Spontaneous Breathing Trial Complete: Yes  Resp Rate Observed: 22  Ve: 10.8  VT: 493  NIF: -23  VC: 995  [place] Agitation Sedation Scale (RASS): Restless  Patient extubated to a 4L NC. Patient is able to communicate and is negative for stridor. Breath sounds are diminished. No complications with extubation.      Shar Gallegos, RT

## 2017-05-12 NOTE — CDMP QUERY
Please clarify the etiology of the bowel perforation and subsequent peritonitis:     Injury received during surgery on 5/8,     Injury received during surgery on 5/11,     Sequelae of surgery from 2009 Lap Band placement,     Not a surgical complication,      or Other Explanation of clinical findings  =>Unable to Determine (no explanation of clinical findings)    Please clarify and document your clinical opinion in the progress notes and discharge summary including the definitive and/or presumptive diagnosis, (suspected or probable), related to the above clinical findings. Please include clinical findings supporting your diagnosis.     Thanks,  Praveen Snow RN, CDS  Compliant Documentation Management Program  [phone]

## 2017-05-12 NOTE — PROGRESS NOTES
Nutrition:  MST is negative this admission. The patient is s/p lap band removal on 5/8 as an outpatient. The patient developed abdominal pain and was admitted and found to have peritonitis due to SB perforation. He is s/p ex lap, MIGUEL and SBR. The need for TPN due to expected prolonged ileus post-op was discussed during AM ICU IDT rounds today. Upon discussion with Dr. Mercy Carrillo, he feels that the patient will tolerate an oral diet by Sunday and TPN is not necessary at this point. Follow-up based on the standards of care. Carlos Martin.  [facility]  [phone]

## 2017-05-12 NOTE — PROGRESS NOTES
Ventilator check complete; patient has a #8. 0 ET tube secured at the 23 at the lip. Patient is not sedated. Patient is able to follow commands. Breath sounds are diminished. Trachea is midline, Negative for subcutaneous air, and chest excursion is symmetric. Patient is also Negative for cyanosis and is Negative for pitting edema. All alarms are set and audible. Resuscitation bag is at the head of the bed.       Ventilator Settings  Mode FIO2 Rate Tidal Volume Pressure PEEP I:E Ratio   Pressure support  30 %     10 cm H2O  8 cm H20       Peak airway pressure: 19 cm H2O   Minute ventilation: 8.1 l/min     ABG:   Recent Labs      05/12/17   0325  05/11/17   1840  05/11/17   0441   PH  7.41  7.39  7.48*   PCO2  40  43  35   PO2  73*  81  59*   HCO3  24  26  26         Татьяна Ely, RT

## 2017-05-12 NOTE — PROGRESS NOTES
Pulmonary Daily Progress NOTE    Willaim Nissen    5/12/2017    Date of Admission:  5/11/2017     Patient is a 47 y.o.  male seen and evaluated at the request of Dr. [place] Clair. Now in ICU on Vent noted to have     Postoperative Diagnosis: PERFORATED SMALL BOWEL WITH A BOWEL OBSTRUCTION   Procedure(s): EXPLORATORY LAPAROTOMY, LYSIS OF ADHESIONS AND A SMALL BOWEL RESECTION    The patient's chart is reviewed and the patient is discussed with the staff. Subjective:     Patient is on vent today and awake. Pain is under control    Review of Systems  Limited since on Vent -- no dyspnea. NO pain.        Current Facility-Administered Medications   Medication Dose Route Frequency    Vancomycin trough reminder   Other ONCE    metoclopramide HCl (REGLAN) injection 10 mg  10 mg IntraVENous Q6H    piperacillin-tazobactam (ZOSYN) 4.5 g in 0.9% sodium chloride (MBP/ADV) 100 mL  4.5 g IntraVENous Q8H    vancomycin (VANCOCIN) 2000 mg in  ml infusion  2,000 mg IntraVENous Q12H    0.9% sodium chloride with KCl 20 mEq/L infusion  100 mL/hr IntraVENous CONTINUOUS    sodium chloride (NS) flush 5-10 mL  5-10 mL IntraVENous Q8H    sodium chloride (NS) flush 5-10 mL  5-10 mL IntraVENous PRN    naloxone (NARCAN) injection 0.4 mg  0.4 mg IntraVENous PRN    pantoprazole (PROTONIX) 40 mg in sodium chloride 0.9 % 10 mL injection  40 mg IntraVENous Q24H    diphenhydrAMINE (BENADRYL) injection 12.5 mg  12.5 mg IntraVENous Q4H PRN    LORazepam (ATIVAN) injection 1 mg  1 mg IntraVENous Q6H PRN    dextrose 40% (GLUTOSE) oral gel 1 Tube  1 Tube Oral PRN    glucagon (GLUCAGEN) injection 1 mg  1 mg IntraMUSCular PRN    dextrose (D50W) injection syrg 12.5-25 g  25-50 mL IntraVENous PRN    ondansetron (ZOFRAN) injection 4 mg  4 mg IntraVENous Q6H PRN    HYDROmorphone (PF) (DILAUDID) injection 0.5 mg  0.5 mg IntraVENous Q1H PRN    HYDROmorphone (PF) (DILAUDID) injection 1 mg  1 mg IntraVENous Q1H PRN  enalaprilat (VASOTEC) injection 1.25 mg  1.25 mg IntraVENous Q4H PRN    metoprolol (LOPRESSOR) injection 1.25 mg  1.25 mg IntraVENous Q4H PRN    sodium chloride 0.9 % bolus infusion 1,000 mL  1,000 mL IntraVENous PRN    enoxaparin (LOVENOX) injection 40 mg  40 mg SubCUTAneous Q12H    propofol (DIPRIVAN) infusion  5-50 mcg/kg/min IntraVENous TITRATE    fentaNYL in normal saline (pf) 25 mcg/mL infusion   mcg/hr IntraVENous TITRATE         Objective:     Vitals:    05/12/17 0500 05/12/17 0530 05/12/17 0715 05/12/17 0824   BP: 110/71 116/71     Pulse: 83 82  70   Resp: 24 13  14   Temp:   98.5 °F (36.9 °C)    SpO2: 96% 96%  100%   Weight:       Height:           PHYSICAL EXAM     Ventilator Settings  Mode FIO2 Rate Tidal Volume Pressure PEEP   Pressure support  30 %    500 ml  10 cm H2O  8 cm H20      Peak airway pressure: 19 cm H2O   Minute ventilation: 8.1 l/min     ABG:   Recent Labs      05/12/17   0325  05/11/17   1840  05/11/17   0441   PH  7.41  7.39  7.48*   PCO2  40  43  35   PO2  73*  81  59*   HCO3  24  26  26        Constitutional:  the patient is well developed and in no acute distress on Vent  EENMT:  Sclera clear, pupils equal, oral mucosa moist  Respiratory: CTA b/l. No wheezing  Cardiovascular:  RRR without M,G,R  Gastrointestinal: soft and distended with sutures   Musculoskeletal: warm without cyanosis. There is no lower leg edema. Skin:  no jaundice or rashes, abdominal wounds with no drains  Neurologic: no gross neuro deficits     Psychiatric:  Awake and alert.      Chest X-ray:   ET noted with atelectasis in bases Left > Right        Yesterday          Recent Labs      05/12/17   0500  05/11/17   0430   WBC  5.7  5.9   HGB  13.5*  15.8   HCT  39.9*  45.5   PLT  210  214     Recent Labs      05/12/17   0500  05/11/17   0430   NA  142  138   K  4.6  4.1   CL  107  101   GLU  152*  166*   CO2  27  27   BUN  22  26*   CREA  1.02  1.13   MG  2.2   --    PHOS  2.4*   --    CA  8.4  9.4 ALB   --   2.9*   TBILI   --   0.8   ALT   --   35   SGOT   --   19     Recent Labs      05/12/17   0325  05/11/17   1840  05/11/17   0441   PH  7.41  7.39  7.48*   PCO2  40  43  35   PO2  73*  81  59*   HCO3  24  26  26     Recent Labs      05/11/17   0435   LAC  1.5       Assessment:  (Medical Decision Making)     Hospital Problems  Date Reviewed: 5/11/2017          Codes Class Noted POA    * (Principal)Generalized abdominal pain ICD-10-CM: R10.84  ICD-9-CM: 789.07  5/11/2017 Yes    --not now    Intractable cyclical vomiting with nausea ICD-10-CM: G43. A1  ICD-9-CM: 536.2  5/11/2017 Yes    --from issues below    SBO (small bowel obstruction) ([facility] ICD-10-CM: K56.69  ICD-9-CM: 560.9  5/11/2017 Yes        Small bowel perforation ([facility] ICD-10-CM: K63.1  ICD-9-CM: 569.83  5/11/2017 Unknown    --s/p surgery    PAM on CPAP ICD-10-CM: G47.33, Z99.89  ICD-9-CM: 327.23, V46.8  5/11/2017 Unknown    --has a home CPAP    Encounter for weaning from ventilator [facility]) ICD-10-CM: Z99.11  ICD-9-CM: V46.13  5/11/2017 Unknown    --see below    Morbid obesity ([facility] ICD-10-CM: E66.01  ICD-9-CM: 278.01  Unknown Yes        Bowel obstruction ([facility] ICD-10-CM: K56.60  ICD-9-CM: 560.9  5/11/2017 Unknown              Plan:  (Medical Decision Making)     -- extubate patient tolerated PS  -- does use CPAP at home and may need. -- Family will have to bring it in.  -- abdominal management per surgery  -- abx per surgery  --patient reports does have PAM and uses CPAP -- told nurse to tell his family to bring it in.   --NO TPN per Dr. Maia Harrison, so cancel PICC  --continue remaining treatment. More than 50% of the time documented was spent in face-to-face contact with the patient and in the care of the patient on the floor/unit where the patient is located.      Jena Duong MD

## 2017-05-12 NOTE — PROGRESS NOTES
Bedside shift report received from Renée [facility]. Pt is resting comfortably in bed with no signs of acute distress. Pt is A&O and follows commands. Breath sounds are diminished with symmetrical chest expansion. Ptis on ventilator. VSS. NSR on monitor with S1/S2 auscultated. Abdomen is distended, rotund and tender. Skin is intact with midline abdominal incision, drg C/D/I. See complete assessment as charted. Lines: 18G R hand, 20G L AC, 20G L wrist  Drips: NS with  mL/hr, fentanyl @ 25 mcg/hr, diprivan @ 10 mcg/kg/min  Drains: [place]    Will continue to monitor pt.

## 2017-05-12 NOTE — INTERDISCIPLINARY ROUNDS
Interdisciplinary team rounds were held 5/12/2017 with the following team members:Care Management, Nursing, Nurse Practitioner, Nutrition, Outcomes Management, Palliative Care, Pastoral Care, Pharmacy, Physician, Respiratory Therapy and Clinical Coordinator and the patient. Plan of care discussed. See clinical pathway and/or care plan for interventions and desired outcomes.

## 2017-05-13 PROBLEM — G47.33 OSA ON CPAP: Chronic | Status: ACTIVE | Noted: 2017-05-11

## 2017-05-13 PROBLEM — Z99.89 OSA ON CPAP: Chronic | Status: ACTIVE | Noted: 2017-05-11

## 2017-05-13 PROBLEM — Z99.11 ENCOUNTER FOR WEANING FROM VENTILATOR (HCC): Status: RESOLVED | Noted: 2017-05-11 | Resolved: 2017-05-13

## 2017-05-13 LAB
ANION GAP BLD CALC-SCNC: 4 MMOL/L (ref 7–16)
BASOPHILS # BLD AUTO: 0 K/UL (ref 0–0.2)
BASOPHILS # BLD: 0 % (ref 0–2)
BUN SERPL-MCNC: 23 MG/DL (ref 6–23)
CALCIUM SERPL-MCNC: 8.2 MG/DL (ref 8.3–10.4)
CHLORIDE SERPL-SCNC: 110 MMOL/L (ref 98–107)
CO2 SERPL-SCNC: 31 MMOL/L (ref 21–32)
CREAT SERPL-MCNC: 0.87 MG/DL (ref 0.8–1.5)
DIFFERENTIAL METHOD BLD: ABNORMAL
EOSINOPHIL # BLD: 0 K/UL (ref 0–0.8)
EOSINOPHIL NFR BLD: 1 % (ref 0.5–7.8)
ERYTHROCYTE [DISTWIDTH] IN BLOOD BY AUTOMATED COUNT: 14.9 % (ref 11.9–14.6)
GLUCOSE SERPL-MCNC: 123 MG/DL (ref 65–100)
HCT VFR BLD AUTO: 36.8 % (ref 41.1–50.3)
HGB BLD-MCNC: 12.3 G/DL (ref 13.6–17.2)
IMM GRANULOCYTES # BLD: 0 K/UL (ref 0–0.5)
IMM GRANULOCYTES NFR BLD AUTO: 0.2 % (ref 0–5)
LYMPHOCYTES # BLD AUTO: 31 % (ref 13–44)
LYMPHOCYTES # BLD: 1.8 K/UL (ref 0.5–4.6)
MCH RBC QN AUTO: 29.9 PG (ref 26.1–32.9)
MCHC RBC AUTO-ENTMCNC: 33.4 G/DL (ref 31.4–35)
MCV RBC AUTO: 89.5 FL (ref 79.6–97.8)
MONOCYTES # BLD: 0.1 K/UL (ref 0.1–1.3)
MONOCYTES NFR BLD AUTO: 3 % (ref 4–12)
NEUTS SEG # BLD: 3.7 K/UL (ref 1.7–8.2)
NEUTS SEG NFR BLD AUTO: 65 % (ref 43–78)
PLATELET # BLD AUTO: 222 K/UL (ref 150–450)
PMV BLD AUTO: 9.6 FL (ref 10.8–14.1)
POTASSIUM SERPL-SCNC: 4.2 MMOL/L (ref 3.5–5.1)
RBC # BLD AUTO: 4.11 M/UL (ref 4.23–5.67)
SODIUM SERPL-SCNC: 145 MMOL/L (ref 136–145)
WBC # BLD AUTO: 5.7 K/UL (ref 4.3–11.1)

## 2017-05-13 PROCEDURE — 65270000029 HC RM PRIVATE

## 2017-05-13 PROCEDURE — 80048 BASIC METABOLIC PNL TOTAL CA: CPT | Performed by: INTERNAL MEDICINE

## 2017-05-13 PROCEDURE — 74011250636 HC RX REV CODE- 250/636: Performed by: INTERNAL MEDICINE

## 2017-05-13 PROCEDURE — 74011250637 HC RX REV CODE- 250/637: Performed by: NURSE PRACTITIONER

## 2017-05-13 PROCEDURE — 74011000250 HC RX REV CODE- 250: Performed by: SURGERY

## 2017-05-13 PROCEDURE — 74011250636 HC RX REV CODE- 250/636: Performed by: SURGERY

## 2017-05-13 PROCEDURE — 85025 COMPLETE CBC W/AUTO DIFF WBC: CPT | Performed by: INTERNAL MEDICINE

## 2017-05-13 PROCEDURE — C9113 INJ PANTOPRAZOLE SODIUM, VIA: HCPCS | Performed by: SURGERY

## 2017-05-13 PROCEDURE — 74011000258 HC RX REV CODE- 258: Performed by: SURGERY

## 2017-05-13 PROCEDURE — 36415 COLL VENOUS BLD VENIPUNCTURE: CPT | Performed by: INTERNAL MEDICINE

## 2017-05-13 PROCEDURE — 99232 SBSQ HOSP IP/OBS MODERATE 35: CPT | Performed by: INTERNAL MEDICINE

## 2017-05-13 RX ORDER — VANCOMYCIN 1.75 GRAM/500 ML IN 0.9 % SODIUM CHLORIDE INTRAVENOUS
1750 EVERY 8 HOURS
Status: DISCONTINUED | OUTPATIENT
Start: 2017-05-13 | End: 2017-05-15

## 2017-05-13 RX ADMIN — Medication 10 ML: at 13:49

## 2017-05-13 RX ADMIN — VANCOMYCIN HYDROCHLORIDE 1750 MG: 10 INJECTION, POWDER, LYOPHILIZED, FOR SOLUTION INTRAVENOUS at 17:24

## 2017-05-13 RX ADMIN — PIPERACILLIN, TAZOBACTAM 4.5 G: 4; .5 INJECTION, POWDER, LYOPHILIZED, FOR SOLUTION INTRAVENOUS at 04:32

## 2017-05-13 RX ADMIN — ENOXAPARIN SODIUM 40 MG: 40 INJECTION SUBCUTANEOUS at 05:09

## 2017-05-13 RX ADMIN — PIPERACILLIN, TAZOBACTAM 4.5 G: 4; .5 INJECTION, POWDER, LYOPHILIZED, FOR SOLUTION INTRAVENOUS at 12:17

## 2017-05-13 RX ADMIN — SODIUM CHLORIDE AND POTASSIUM CHLORIDE 100 ML/HR: 9; 1.49 INJECTION, SOLUTION INTRAVENOUS at 14:10

## 2017-05-13 RX ADMIN — PIPERACILLIN, TAZOBACTAM 4.5 G: 4; .5 INJECTION, POWDER, LYOPHILIZED, FOR SOLUTION INTRAVENOUS at 20:45

## 2017-05-13 RX ADMIN — Medication 10 ML: at 21:03

## 2017-05-13 RX ADMIN — HYDROMORPHONE HYDROCHLORIDE 1 MG: 1 INJECTION, SOLUTION INTRAMUSCULAR; INTRAVENOUS; SUBCUTANEOUS at 21:02

## 2017-05-13 RX ADMIN — Medication: at 17:13

## 2017-05-13 RX ADMIN — METOCLOPRAMIDE 10 MG: 5 INJECTION, SOLUTION INTRAMUSCULAR; INTRAVENOUS at 05:10

## 2017-05-13 RX ADMIN — Medication 10 ML: at 05:11

## 2017-05-13 RX ADMIN — METOCLOPRAMIDE 10 MG: 5 INJECTION, SOLUTION INTRAMUSCULAR; INTRAVENOUS at 00:02

## 2017-05-13 RX ADMIN — VANCOMYCIN HYDROCHLORIDE 1750 MG: 10 INJECTION, POWDER, LYOPHILIZED, FOR SOLUTION INTRAVENOUS at 09:14

## 2017-05-13 RX ADMIN — ENOXAPARIN SODIUM 40 MG: 40 INJECTION SUBCUTANEOUS at 17:13

## 2017-05-13 RX ADMIN — SODIUM CHLORIDE 40 MG: 9 INJECTION INTRAMUSCULAR; INTRAVENOUS; SUBCUTANEOUS at 12:17

## 2017-05-13 RX ADMIN — HYDROMORPHONE HYDROCHLORIDE 1 MG: 1 INJECTION, SOLUTION INTRAMUSCULAR; INTRAVENOUS; SUBCUTANEOUS at 13:49

## 2017-05-13 RX ADMIN — METOCLOPRAMIDE 10 MG: 5 INJECTION, SOLUTION INTRAMUSCULAR; INTRAVENOUS at 12:17

## 2017-05-13 RX ADMIN — METOCLOPRAMIDE 10 MG: 5 INJECTION, SOLUTION INTRAMUSCULAR; INTRAVENOUS at 17:13

## 2017-05-13 NOTE — PROGRESS NOTES
General Surgery Progress Note    5/13/2017    Admit Date: 5/11/2017    Subjective:     Surgery POD #2  Patient extubated yesterday, then transferred to the second floor. No flatus or BM yet. Vital signs and blood work normal. NG-tube output high, but could be due to large amount of ice chips he has been consuming. Will leave NG-tube in place for now. Patient wants Lagos out. Objective:     Visit Vitals    /79    Pulse 83    Temp 97.7 °F (36.5 °C)    Resp 18    Ht 5' 10\" (1.778 m)    Wt 295 lb 6.7 oz (134 kg)    SpO2 96%    BMI 42.39 kg/m2         Intake/Output Summary (Last 24 hours) at 05/13/17 0842  Last data filed at 05/13/17 0526   Gross per 24 hour   Intake             2376 ml   Output             4250 ml   Net            -1874 ml        EXAM:  ABD soft, mild distention, active BS'S. Wound is clean with staples in place. Data Review    Recent Results (from the past 24 hour(s))   VANCOMYCIN, TROUGH    Collection Time: 05/12/17  8:25 PM   Result Value Ref Range    Vancomycin,trough 9.8 5 - 20 ug/mL   CBC WITH AUTOMATED DIFF    Collection Time: 05/13/17  5:23 AM   Result Value Ref Range    WBC 5.7 4.3 - 11.1 K/uL    RBC 4.11 (L) 4.23 - 5.67 M/uL    HGB 12.3 (L) 13.6 - 17.2 g/dL    HCT 36.8 (L) 41.1 - 50.3 %    MCV 89.5 79.6 - 97.8 FL    MCH 29.9 26.1 - 32.9 PG    MCHC 33.4 31.4 - 35.0 g/dL    RDW 14.9 (H) 11.9 - 14.6 %    PLATELET 726 139 - 243 K/uL    MPV 9.6 (L) 10.8 - 14.1 FL    DF AUTOMATED      NEUTROPHILS 65 43 - 78 %    LYMPHOCYTES 31 13 - 44 %    MONOCYTES 3 (L) 4.0 - 12.0 %    EOSINOPHILS 1 0.5 - 7.8 %    BASOPHILS 0 0.0 - 2.0 %    IMMATURE GRANULOCYTES 0.2 0.0 - 5.0 %    ABS. NEUTROPHILS 3.7 1.7 - 8.2 K/UL    ABS. LYMPHOCYTES 1.8 0.5 - 4.6 K/UL    ABS. MONOCYTES 0.1 0.1 - 1.3 K/UL    ABS. EOSINOPHILS 0.0 0.0 - 0.8 K/UL    ABS. BASOPHILS 0.0 0.0 - 0.2 K/UL    ABS. IMM.  GRANS. 0.0 0.0 - 0.5 K/UL   METABOLIC PANEL, BASIC    Collection Time: 05/13/17  5:23 AM   Result Value Ref Range Sodium 145 136 - 145 mmol/L    Potassium 4.2 3.5 - 5.1 mmol/L    Chloride 110 (H) 98 - 107 mmol/L    CO2 31 21 - 32 mmol/L    Anion gap 4 (L) 7 - 16 mmol/L    Glucose 123 (H) 65 - 100 mg/dL    BUN 23 6 - 23 MG/DL    Creatinine 0.87 0.8 - 1.5 MG/DL    GFR est AA >60 >60 ml/min/1.73m2    GFR est non-AA >60 >60 ml/min/1.73m2    Calcium 8.2 (L) 8.3 - 10.4 MG/DL        Hospital Problems  Date Reviewed: 5/11/2017          Codes Class Noted POA    * (Principal)Generalized abdominal pain ICD-10-CM: R10.84  ICD-9-CM: 789.07  5/11/2017 Yes        Intractable cyclical vomiting with nausea ICD-10-CM: G43. A1  ICD-9-CM: 536.2  5/11/2017 Yes        SBO (small bowel obstruction) ([facility] ICD-10-CM: K56.69  ICD-9-CM: 560.9  5/11/2017 Yes        Musculoskeletal disorder ICD-10-CM: M79.9  ICD-9-CM: 729.90  Unknown Yes        Morbid obesity ([facility] (Chronic) ICD-10-CM: E66.01  ICD-9-CM: 278.01  Unknown Yes        Contact dermatitis and other eczema, due to unspecified cause ICD-10-CM: L25.9  ICD-9-CM: 692.9  Unknown Yes        PAM on CPAP (Chronic) ICD-10-CM: G47.33, Z99.89  ICD-9-CM: 327.23, V46.8  5/11/2017 Yes        Small bowel perforation ([facility] ICD-10-CM: K63.1  ICD-9-CM: 569.83  5/11/2017 Unknown          1. Remove Lagos  2. Leave NG-tube  3. Pain control  4. OOB/IS/Lovenox  5. Reglan  6. Await return of bowel function.   Madan Navarro MD.

## 2017-05-13 NOTE — PROGRESS NOTES
Pharmacokinetic Consult to Pharmacist    Mo Caruso is a 47 y.o. male being treated for Sepsis, SBO  With Vancomycin  And Zosyn. S/P lap band removal by Dr Black Patino on 5/08/2017. Has been having progressive  Nausea with abd pain. Transferred from [place] [address] to [facility]. Height: 5' 10\" (177.8 cm)  Weight: 134 kg (295 lb 6.7 oz)  Lab Results   Component Value Date/Time    BUN 23 05/13/2017 05:23 AM    Creatinine 0.87 05/13/2017 05:23 AM    WBC 5.7 05/13/2017 05:23 AM      Estimated Creatinine Clearance: 135.3 mL/min (based on Cr of 0.87). CULTURES:  Blood - NGTD, in process  MRSA screen - negative    Lab Results   Component Value Date/Time    Vancomycin,trough 9.8 05/12/2017 08:25 PM         Day 3 of vancomycin. Goal trough is 15 - 20 . Trough below goal. Will adjust dosing to 1750 mg q 8 hours.  Clinical staff will continue to follow patient        Thank you,  Jennifer Barker, PharmD  Clinical Pharmacist  [phone]

## 2017-05-13 NOTE — PROGRESS NOTES
Patient was found laying flat in bed. Raised head of bed and explained to patient why he needs to have head of bed elevated. At 2021 hrs administered Dilaudid 1 mg IV for pain. Recently emptied out 1000 ml of green liquid out of NG cannister.

## 2017-05-13 NOTE — PROGRESS NOTES
END OF SHIFT NOTE:    INTAKE/OUTPUT  05/12 0701 - 05/13 0700  In: 2376 [I.V.:2376]  Out: 9570 [Urine:2050]  Voiding: YES  Catheter: YES  Drain:   Nasogastric Tube 05/11/17 (Active)   Site Assessment Clean, dry, & intact 5/12/2017  7:10 PM   Dressing Status Clean, dry, & intact 5/12/2017  7:10 PM   G Port Status Intermittent Suction 5/12/2017  7:10 PM   Action Taken Placement verified (comment) 5/12/2017  7:00 AM   Drainage Description Green 5/12/2017  7:10 PM   Drainage Chamber Level (ml) 500 ml 5/13/2017  5:26 AM   Output (ml) 1480 ml 5/13/2017  5:26 AM               Flatus: Patient does not have flatus present. Stool:  0 occurrences. Characteristics:       Emesis: 0 occurrences. Characteristics:        VITAL SIGNS  Patient Vitals for the past 12 hrs:   Temp Pulse Resp BP SpO2   05/13/17 0100 97.5 °F (36.4 °C) 80 18 130/78 97 %   05/12/17 2300 98.7 °F (37.1 °C) (!) 57 18 131/79 95 %   05/12/17 1910 98.5 °F (36.9 °C) 84 18 127/76 94 %       Pain Assessment  Pain Intensity 1: 3 (05/13/17 0100)  Pain Location 1: Abdomen  Pain Intervention(s) 1: Declines, Distraction, Emotional support  Patient Stated Pain Goal: 0    Ambulating  No    Shift report given to oncoming nurse at the bedside.     Mayra Enrique RN

## 2017-05-13 NOTE — PROGRESS NOTES
Kirsten Spain  Admission Date: 5/11/2017             Daily Progress Note: 5/13/2017    The patient's chart is reviewed and the patient is discussed with the staff. 48 yo male with a history of PAM and morbid obesity. He had lap band placed 2009. It was not working with no net wt loss and felt to be contributing to chest pain following a negative cardiac workup. UGI showed resistance to passage of a barium tablet and reflux with esophageal spasm. Now s/p lap band removal 5/8/2017. He was discharged home but developed progressive n/v/abd pain. He presented to the ER at [facility] and was transferred to [facility] for continued care of SBO. He was taken to the OR on 5/11 for peritonitis secondary to small bowel injury/obstruction and underwent exploratory lap, lysis of adhesions and small bowel resection with primary anastomosis per Dr. Franklin Rodriguez. Post-op he was taken to the ICU and remained intubated until the following day. Subjective:     Currently on o2 at 4 lpm via NC with o2 sat 96% >>>RA during exam with o2 sat 91%. + cough but difficulty expectorating. Using IS and drawing around 1500 ml. Denies chest pain, palpitations. NGT to suction - denies nausea. Not passing flatus.       Current Facility-Administered Medications   Medication Dose Route Frequency    metoclopramide HCl (REGLAN) injection 10 mg  10 mg IntraVENous Q6H    polyvinyl alcohol (LIQUIFILM TEARS) 1.4 % ophthalmic solution 1 Drop  1 Drop Both Eyes PRN    piperacillin-tazobactam (ZOSYN) 4.5 g in 0.9% sodium chloride (MBP/ADV) 100 mL  4.5 g IntraVENous Q8H    vancomycin (VANCOCIN) 2000 mg in  ml infusion  2,000 mg IntraVENous Q12H    0.9% sodium chloride with KCl 20 mEq/L infusion  100 mL/hr IntraVENous CONTINUOUS    sodium chloride (NS) flush 5-10 mL  5-10 mL IntraVENous Q8H    sodium chloride (NS) flush 5-10 mL  5-10 mL IntraVENous PRN    naloxone (NARCAN) injection 0.4 mg  0.4 mg IntraVENous PRN    pantoprazole (PROTONIX) 40 mg in sodium chloride 0.9 % 10 mL injection  40 mg IntraVENous Q24H    diphenhydrAMINE (BENADRYL) injection 12.5 mg  12.5 mg IntraVENous Q4H PRN    LORazepam (ATIVAN) injection 1 mg  1 mg IntraVENous Q6H PRN    dextrose 40% (GLUTOSE) oral gel 1 Tube  1 Tube Oral PRN    glucagon (GLUCAGEN) injection 1 mg  1 mg IntraMUSCular PRN    dextrose (D50W) injection syrg 12.5-25 g  25-50 mL IntraVENous PRN    ondansetron (ZOFRAN) injection 4 mg  4 mg IntraVENous Q6H PRN    HYDROmorphone (PF) (DILAUDID) injection 0.5 mg  0.5 mg IntraVENous Q1H PRN    HYDROmorphone (PF) (DILAUDID) injection 1 mg  1 mg IntraVENous Q1H PRN    enalaprilat (VASOTEC) injection 1.25 mg  1.25 mg IntraVENous Q4H PRN    metoprolol (LOPRESSOR) injection 1.25 mg  1.25 mg IntraVENous Q4H PRN    sodium chloride 0.9 % bolus infusion 1,000 mL  1,000 mL IntraVENous PRN    enoxaparin (LOVENOX) injection 40 mg  40 mg SubCUTAneous Q12H    propofol (DIPRIVAN) infusion  5-50 mcg/kg/min IntraVENous TITRATE    fentaNYL in normal saline (pf) 25 mcg/mL infusion   mcg/hr IntraVENous TITRATE       Review of Systems  Constitutional: negative for fever, chills, sweats  Cardiovascular: negative for chest pain, palpitations, syncope, edema  Gastrointestinal:  negative for dysphagia, reflux, vomiting, diarrhea, abdominal pain, or melena  Neurologic:  negative for focal weakness, numbness, headache    Objective:     Vitals:    05/12/17 1910 05/12/17 2300 05/13/17 0100 05/13/17 0632   BP: 127/76 131/79 130/78 128/79   Pulse: 84 (!) 57 80 83   Resp: 18 18 18 18   Temp: 98.5 °F (36.9 °C) 98.7 °F (37.1 °C) 97.5 °F (36.4 °C) 97.7 °F (36.5 °C)   SpO2: 94% 95% 97% 96%   Weight:       Height:         Intake and Output:   05/11 1901 - 05/13 0700  In: 2376 [I.V.:2376]  Out: 5725 [Urine:7205]       Physical Exam:   Constitution:  the patient is well developed and in no acute distress  EENMT:  Sclera clear, pupils equal, oral mucosa moist  Respiratory: diminished but CTA bilaterally, RA  Cardiovascular:  RRR without M,G,R  Gastrointestinal: soft and non-tender; with positive bowel sounds. Musculoskeletal: warm without cyanosis. There is no lower leg edema. Skin:  no jaundice or rashes, midline abd incision / puncture wounds - no redness, swelling, or drainage  Neurologic: no gross neuro deficits     Psychiatric:  alert and oriented x 3    CHEST XRAY:   5/11        LAB   Recent Labs      05/13/17   0523  05/12/17   0500  05/11/17   0430   WBC  5.7  5.7  5.9   HGB  12.3*  13.5*  15.8   HCT  36.8*  39.9*  45.5   PLT  222  210  214     Recent Labs      05/13/17   0523  05/12/17   0500  05/11/17   0430   NA  145  142  138   K  4.2  4.6  4.1   CL  110*  107  101   CO2  31  27  27   GLU  123*  152*  166*   BUN  23  22  26*   CREA  0.87  1.02  1.13   MG   --   2.2   --    CA  8.2*  8.4  9.4   PHOS   --   2.4*   --    ALB   --    --   2.9*   TBILI   --    --   0.8   ALT   --    --   35   SGOT   --    --   19     Recent Labs      05/12/17   0325  05/11/17   1840  05/11/17   0441   PH  7.41  7.39  7.48*   PCO2  40  43  35   PO2  73*  81  59*   HCO3  24  26  26     Recent Labs      05/11/17   0435   LAC  1.5         Assessment:  (Medical Decision Making)     Hospital Problems  Date Reviewed: 5/11/2017          Codes Class Noted POA    * (Principal)Generalized abdominal pain ICD-10-CM: R10.84  ICD-9-CM: 789.07  5/11/2017 Yes        Intractable cyclical vomiting with nausea ICD-10-CM: G43. A1  ICD-9-CM: 536.2  5/11/2017 Yes    Improved post-op      SBO (small bowel obstruction) ([facility] ICD-10-CM: K56.69  ICD-9-CM: 560.9  5/11/2017 Yes        Musculoskeletal disorder ICD-10-CM: M79.9  ICD-9-CM: 729.90  Unknown Yes        Morbid obesity ([address] (Chronic) ICD-10-CM: E66.01  ICD-9-CM: 278.01  Unknown Yes    Significantly complicating the treatment of multiple medical issues      Contact dermatitis and other eczema, due to unspecified cause ICD-10-CM: L25.9  ICD-9-CM: 692.9  Unknown Yes        PAM on CPAP (Chronic) ICD-10-CM: G47.33, Z99.89  ICD-9-CM: 327.23, V46.8  5/11/2017 Yes    Not able to wear home BIPAP with NGT  Instructed patient to have family bring for future use      Small bowel perforation [facility]) ICD-10-CM: K63.1  ICD-9-CM: 569.83  5/11/2017 Unknown           Plan:  (Medical Decision Making)     --Sharron Vital / Audrey 2   BC: NGTD x 2   WBC 5.7  --family to bring in home BIPAP for use once NGT removed  --weaned to RA  --IS  --mobilize  --pain control    More than 50% of the time documented was spent in face-to-face contact with the patient and in the care of the patient on the floor/unit where the patient is located. Jero Redmond NP    The patient has been seen and examined by me personally, the chart,labs, and radiographic studies have been reviewed. Chest: CTA using IS up to 2000ml  Extremities: trace edema    I agree with the above assessment and plan. Ambulating and recovering well- nothing to add we will be available upon further request and sign off for now.     Dayana Nolasco MD.

## 2017-05-13 NOTE — PROGRESS NOTES
END OF SHIFT NOTE:    INTAKE/OUTPUT  05/12 0701 - 05/13 0700  In: 2376 [I.V.:2376]  Out: 4650 [Urine:2050]  Voiding: YES  Catheter: NO  Drain:   Nasogastric Tube 05/11/17 (Active)   Site Assessment Clean, dry, & intact 5/13/2017  1:50 PM   Dressing Status Clean, dry, & intact 5/13/2017  1:50 PM   G Port Status Intermittent Suction 5/13/2017  1:50 PM   Action Taken Placement verified (comment) 5/12/2017  7:00 AM   Drainage Description Green 5/13/2017  1:50 PM   Drainage Chamber Level (ml) 450 ml 5/13/2017  1:50 PM   Output (ml) 450 ml 5/13/2017  1:50 PM               Flatus: Patient does have flatus present. Stool:  0 occurrences. Characteristics:       Emesis: 0 occurrences. Characteristics:        VITAL SIGNS  Patient Vitals for the past 12 hrs:   Temp Pulse Resp BP SpO2   05/13/17 1527 97.8 °F (36.6 °C) 69 18 130/73 96 %   05/13/17 1130 99.3 °F (37.4 °C) 77 18 130/83 92 %       Pain Assessment  Pain Intensity 1: 0 (05/13/17 1411)  Pain Location 1: Abdomen  Pain Intervention(s) 1: Medication (see MAR)  Patient Stated Pain Goal: 0    Ambulating  Yes    Shift report given to oncoming nurse at the bedside.     María Mcleod RN

## 2017-05-14 LAB
ANION GAP BLD CALC-SCNC: 8 MMOL/L (ref 7–16)
BASOPHILS # BLD AUTO: 0 K/UL (ref 0–0.2)
BASOPHILS # BLD: 1 % (ref 0–2)
BUN SERPL-MCNC: 19 MG/DL (ref 6–23)
CALCIUM SERPL-MCNC: 7.9 MG/DL (ref 8.3–10.4)
CHLORIDE SERPL-SCNC: 112 MMOL/L (ref 98–107)
CO2 SERPL-SCNC: 28 MMOL/L (ref 21–32)
CREAT SERPL-MCNC: 0.88 MG/DL (ref 0.8–1.5)
DIFFERENTIAL METHOD BLD: ABNORMAL
EOSINOPHIL # BLD: 0.2 K/UL (ref 0–0.8)
EOSINOPHIL NFR BLD: 3 % (ref 0.5–7.8)
ERYTHROCYTE [DISTWIDTH] IN BLOOD BY AUTOMATED COUNT: 15 % (ref 11.9–14.6)
GLUCOSE SERPL-MCNC: 108 MG/DL (ref 65–100)
HCT VFR BLD AUTO: 36.6 % (ref 41.1–50.3)
HGB BLD-MCNC: 12.3 G/DL (ref 13.6–17.2)
IMM GRANULOCYTES # BLD: 0.2 K/UL (ref 0–0.5)
IMM GRANULOCYTES NFR BLD AUTO: 3 % (ref 0–5)
LYMPHOCYTES # BLD AUTO: 20 % (ref 13–44)
LYMPHOCYTES # BLD: 1.3 K/UL (ref 0.5–4.6)
MCH RBC QN AUTO: 29.8 PG (ref 26.1–32.9)
MCHC RBC AUTO-ENTMCNC: 33.6 G/DL (ref 31.4–35)
MCV RBC AUTO: 88.6 FL (ref 79.6–97.8)
MONOCYTES # BLD: 0.9 K/UL (ref 0.1–1.3)
MONOCYTES NFR BLD AUTO: 14 % (ref 4–12)
NEUTS SEG # BLD: 3.9 K/UL (ref 1.7–8.2)
NEUTS SEG NFR BLD AUTO: 59 % (ref 43–78)
PLATELET # BLD AUTO: 240 K/UL (ref 150–450)
PMV BLD AUTO: 9.5 FL (ref 10.8–14.1)
POTASSIUM SERPL-SCNC: 3.8 MMOL/L (ref 3.5–5.1)
RBC # BLD AUTO: 4.13 M/UL (ref 4.23–5.67)
SODIUM SERPL-SCNC: 148 MMOL/L (ref 136–145)
VANCOMYCIN TROUGH SERPL-MCNC: 13.9 UG/ML (ref 5–20)
WBC # BLD AUTO: 6.6 K/UL (ref 4.3–11.1)

## 2017-05-14 PROCEDURE — 74011250636 HC RX REV CODE- 250/636: Performed by: SURGERY

## 2017-05-14 PROCEDURE — C9113 INJ PANTOPRAZOLE SODIUM, VIA: HCPCS | Performed by: SURGERY

## 2017-05-14 PROCEDURE — 80202 ASSAY OF VANCOMYCIN: CPT | Performed by: SURGERY

## 2017-05-14 PROCEDURE — 74011000258 HC RX REV CODE- 258: Performed by: SURGERY

## 2017-05-14 PROCEDURE — 65270000029 HC RM PRIVATE

## 2017-05-14 PROCEDURE — 80048 BASIC METABOLIC PNL TOTAL CA: CPT | Performed by: SURGERY

## 2017-05-14 PROCEDURE — 85025 COMPLETE CBC W/AUTO DIFF WBC: CPT | Performed by: SURGERY

## 2017-05-14 PROCEDURE — 74011000250 HC RX REV CODE- 250: Performed by: SURGERY

## 2017-05-14 PROCEDURE — 36415 COLL VENOUS BLD VENIPUNCTURE: CPT | Performed by: SURGERY

## 2017-05-14 PROCEDURE — 74011250636 HC RX REV CODE- 250/636: Performed by: INTERNAL MEDICINE

## 2017-05-14 RX ADMIN — HYDROMORPHONE HYDROCHLORIDE 1 MG: 1 INJECTION, SOLUTION INTRAMUSCULAR; INTRAVENOUS; SUBCUTANEOUS at 18:00

## 2017-05-14 RX ADMIN — PIPERACILLIN, TAZOBACTAM 4.5 G: 4; .5 INJECTION, POWDER, LYOPHILIZED, FOR SOLUTION INTRAVENOUS at 13:11

## 2017-05-14 RX ADMIN — VANCOMYCIN HYDROCHLORIDE 1750 MG: 10 INJECTION, POWDER, LYOPHILIZED, FOR SOLUTION INTRAVENOUS at 18:00

## 2017-05-14 RX ADMIN — METOCLOPRAMIDE 10 MG: 5 INJECTION, SOLUTION INTRAMUSCULAR; INTRAVENOUS at 12:20

## 2017-05-14 RX ADMIN — SODIUM CHLORIDE 40 MG: 9 INJECTION INTRAMUSCULAR; INTRAVENOUS; SUBCUTANEOUS at 12:20

## 2017-05-14 RX ADMIN — VANCOMYCIN HYDROCHLORIDE 1750 MG: 10 INJECTION, POWDER, LYOPHILIZED, FOR SOLUTION INTRAVENOUS at 00:52

## 2017-05-14 RX ADMIN — PIPERACILLIN, TAZOBACTAM 4.5 G: 4; .5 INJECTION, POWDER, LYOPHILIZED, FOR SOLUTION INTRAVENOUS at 20:38

## 2017-05-14 RX ADMIN — VANCOMYCIN HYDROCHLORIDE 1750 MG: 10 INJECTION, POWDER, LYOPHILIZED, FOR SOLUTION INTRAVENOUS at 10:12

## 2017-05-14 RX ADMIN — Medication 10 ML: at 22:38

## 2017-05-14 RX ADMIN — HYDROMORPHONE HYDROCHLORIDE 1 MG: 1 INJECTION, SOLUTION INTRAMUSCULAR; INTRAVENOUS; SUBCUTANEOUS at 20:51

## 2017-05-14 RX ADMIN — ENOXAPARIN SODIUM 40 MG: 40 INJECTION SUBCUTANEOUS at 05:27

## 2017-05-14 RX ADMIN — SODIUM CHLORIDE AND POTASSIUM CHLORIDE 100 ML/HR: 9; 1.49 INJECTION, SOLUTION INTRAVENOUS at 18:00

## 2017-05-14 RX ADMIN — PIPERACILLIN, TAZOBACTAM 4.5 G: 4; .5 INJECTION, POWDER, LYOPHILIZED, FOR SOLUTION INTRAVENOUS at 03:48

## 2017-05-14 RX ADMIN — METOCLOPRAMIDE 10 MG: 5 INJECTION, SOLUTION INTRAMUSCULAR; INTRAVENOUS at 00:12

## 2017-05-14 RX ADMIN — METOCLOPRAMIDE 10 MG: 5 INJECTION, SOLUTION INTRAMUSCULAR; INTRAVENOUS at 05:28

## 2017-05-14 RX ADMIN — ENOXAPARIN SODIUM 40 MG: 40 INJECTION SUBCUTANEOUS at 18:00

## 2017-05-14 RX ADMIN — Medication 10 ML: at 05:28

## 2017-05-14 RX ADMIN — HYDROMORPHONE HYDROCHLORIDE 1 MG: 1 INJECTION, SOLUTION INTRAMUSCULAR; INTRAVENOUS; SUBCUTANEOUS at 03:05

## 2017-05-14 RX ADMIN — METOCLOPRAMIDE 10 MG: 5 INJECTION, SOLUTION INTRAMUSCULAR; INTRAVENOUS at 23:50

## 2017-05-14 RX ADMIN — METOCLOPRAMIDE 10 MG: 5 INJECTION, SOLUTION INTRAMUSCULAR; INTRAVENOUS at 18:00

## 2017-05-14 RX ADMIN — HYDROMORPHONE HYDROCHLORIDE 1 MG: 1 INJECTION, SOLUTION INTRAMUSCULAR; INTRAVENOUS; SUBCUTANEOUS at 13:17

## 2017-05-14 RX ADMIN — SODIUM CHLORIDE AND POTASSIUM CHLORIDE 100 ML/HR: 9; 1.49 INJECTION, SOLUTION INTRAVENOUS at 03:53

## 2017-05-14 NOTE — PROGRESS NOTES
END OF SHIFT NOTE:    INTAKE/OUTPUT  05/13 0701 - 05/14 0700  In: 2432 [I.V.:1385]  Out: 8417 [Urine:2395]  Voiding: YES  Catheter: NO  Drain:              Flatus: Patient does have flatus present. Stool:  2 occurrences. Characteristics:  Stool Assessment  Stool Color: Green  Stool Appearance: Loose  Stool Amount: Medium  Stool Source/Status: Rectum    Emesis: 0 occurrences. Characteristics:        VITAL SIGNS  Patient Vitals for the past 12 hrs:   Temp Pulse Resp BP SpO2   05/14/17 1703 98.8 °F (37.1 °C) 68 18 155/83 90 %   05/14/17 1126 98.5 °F (36.9 °C) 77 18 150/84 92 %       Pain Assessment  Pain Intensity 1: 0 (05/14/17 1841)  Pain Location 1: Abdomen  Pain Intervention(s) 1: Medication (see MAR)  Patient Stated Pain Goal: 0    Ambulating  Yes    Shift report given to oncoming nurse at the bedside.     Don Severino RN

## 2017-05-14 NOTE — PROGRESS NOTES
PLAN:  DC NG-tube  Pain control  OOB/IS/Lovenox  Reglan  Start CLD    ASSESSMENT:  Admit Date: 5/11/2017   3 Days Post-Op  Procedure(s):  LAPAROTOMY EXPLORATORY /SMALL BOWEL RESECTION    Principal Problem:    Generalized abdominal pain (5/11/2017)    Active Problems:    Intractable cyclical vomiting with nausea (5/11/2017)      SBO (small bowel obstruction) ([facility] (5/11/2017)      Musculoskeletal disorder ()      Morbid obesity ([facility] ()      Contact dermatitis and other eczema, due to unspecified cause ()      PAM on CPAP (5/11/2017)      Small bowel perforation ([facility] (5/11/2017)       SUBJECTIVE:  Surgery POD #3  Pt awake in bed. + flatus and + BM x2. Voiding without difficulty. NG-tube output 900/450 over last 24 hours, but could be due to large amount of ice chips he has been consuming. Reports pain controlled. Has been up walking in the koo. AF, VSS. OBJECTIVE:  Constitutional: Alert, cooperative, no acute distress; appears stated age   Visit Vitals    /81 (BP 1 Location: Right arm, BP Patient Position: At rest)    Pulse 74    Temp 98.2 °F (36.8 °C)    Resp 18    Ht 5' 10\" (1.778 m)    Wt 295 lb 6.7 oz (134 kg)    SpO2 91%    BMI 42.39 kg/m2     Eyes:Sclera are clear. ENMT: no external lesions gross hearing normal; no obvious neck masses, no ear or lip lesions  CV: RRR. Normal perfusion  Resp: No JVD. Breathing is  non-labored; no audible wheezing. GI: soft, appropriately tender, non-distended, BS active, post op staples c/d/i. Mild erythema at base of staples marked last night. Musculoskeletal: unremarkable with normal function. No embolic signs or cyanosis.    Neuro:  Oriented; moves all 4; no focal deficits  Psychiatric: normal affect and mood, no memory impairment      Patient Vitals for the past 24 hrs:   BP Temp Pulse Resp SpO2   05/14/17 0356 142/81 98.2 °F (36.8 °C) 74 18 91 %   05/13/17 1950 134/78 98.6 °F (37 °C) 74 18 93 %   05/13/17 1527 130/73 97.8 °F (36.6 °C) 69 18 96 % 05/13/17 1130 130/83 99.3 °F (37.4 °C) 77 18 92 %     Labs:  Recent Labs      05/14/17   0500   WBC  6.6   HGB  12.3*   PLT  240   NA  148*   K  3.8   CL  112*   CO2  28   BUN  19   CREA  0.88   GLU  108*     Chaparrita Go, [facility]    The patient was seen in conjunction with Dr. Cristina Silverman who independently evaluated the patient, reviewed the chart and agreed with the assessment and plan.

## 2017-05-14 NOTE — PROGRESS NOTES
Pharmacokinetic Consult to Pharmacist    Mateusz Maxwell is a 47 y.o. male being treated for Sepsis, SBO  With Vancomycin  And Zosyn. S/P lap band removal by Dr Tom Gamino on 5/08/2017. Has been having progressive  Nausea with abd pain. Transferred from [place] [address] to [facility]. Height: 5' 10\" (177.8 cm)  Weight: 134 kg (295 lb 6.7 oz)  Lab Results   Component Value Date/Time    BUN 19 05/14/2017 05:00 AM    Creatinine 0.88 05/14/2017 05:00 AM    WBC 6.6 05/14/2017 05:00 AM      Estimated Creatinine Clearance: 133.7 mL/min (based on Cr of 0.88). CULTURES:  Blood - NGTD, in process  MRSA screen - negative    Lab Results   Component Value Date/Time    Vancomycin,trough 13.9 05/14/2017 10:06 AM         Day 3 of vancomycin. Goal trough is 15 - 20 . Trough slight below goal but trough was drawn ~ 2 hours late. Will continue dosing of 1750 mg q 8 hours.      Thank you,  Sarah Tse, PharmD  Clinical Pharmacist  [phone]

## 2017-05-14 NOTE — PROGRESS NOTES
Surgery Addendum  As per NP's note. Patient had two BM'S last night. He tolerated clear liquids this morning. No nausea or vomiting. NG-tube and Lagos are out. Has some cellulitis of lower pole of wound. Continue antibiotics. I discussed the bowel injury as being created during the 5/8/ 17 surgery, likely due to a trocar injury.    Katie De Guzman MD.

## 2017-05-14 NOTE — PROGRESS NOTES
END OF SHIFT NOTE:    INTAKE/OUTPUT  05/13 0701 - 05/14 0700  In: 9153 [I.V.:1385]  Out: 9685 [Urine:2395]  Voiding: YES  Catheter: NO  Drain:   Nasogastric Tube 05/11/17 (Active)   Site Assessment Clean, dry, & intact 5/13/2017  7:00 PM   Dressing Status Clean, dry, & intact 5/13/2017  7:00 PM   G Port Status Intermittent Suction 5/13/2017  7:00 PM   Action Taken Placement verified (comment) 5/12/2017  7:00 AM   Drainage Description Green 5/13/2017  7:00 PM   Drainage Chamber Level (ml) 900 ml 5/13/2017  8:00 PM   Output (ml) 450 ml 5/13/2017  8:00 PM               Flatus: Patient does have flatus present. Stool:  1 occurrences. Characteristics:  Stool Assessment  Stool Color: Brown  Stool Appearance: Soft  Stool Amount: Medium  Stool Source/Status: Rectum    Emesis: 0 occurrences. Characteristics:        VITAL SIGNS  Patient Vitals for the past 12 hrs:   Temp Pulse Resp BP SpO2   05/14/17 0356 98.2 °F (36.8 °C) 74 18 142/81 91 %   05/13/17 1950 98.6 °F (37 °C) 74 18 134/78 93 %       Pain Assessment  Pain Intensity 1: 3 (05/14/17 0405)  Pain Location 1: Abdomen  Pain Intervention(s) 1: Declines  Patient Stated Pain Goal: 0    Ambulating  Yes    Shift report given to oncoming nurse at the bedside.     Chris Barnes RN

## 2017-05-15 VITALS
OXYGEN SATURATION: 92 % | WEIGHT: 295.42 LBS | HEART RATE: 83 BPM | RESPIRATION RATE: 20 BRPM | SYSTOLIC BLOOD PRESSURE: 158 MMHG | TEMPERATURE: 98.2 F | HEIGHT: 70 IN | BODY MASS INDEX: 42.29 KG/M2 | DIASTOLIC BLOOD PRESSURE: 93 MMHG

## 2017-05-15 LAB
ANION GAP BLD CALC-SCNC: 10 MMOL/L (ref 7–16)
BUN SERPL-MCNC: 11 MG/DL (ref 6–23)
CALCIUM SERPL-MCNC: 8 MG/DL (ref 8.3–10.4)
CHLORIDE SERPL-SCNC: 107 MMOL/L (ref 98–107)
CO2 SERPL-SCNC: 27 MMOL/L (ref 21–32)
CREAT SERPL-MCNC: 0.83 MG/DL (ref 0.8–1.5)
DIFFERENTIAL METHOD BLD: ABNORMAL
EOSINOPHIL # BLD: 0.3 K/UL (ref 0–0.8)
EOSINOPHIL NFR BLD MANUAL: 3 % (ref 1–8)
ERYTHROCYTE [DISTWIDTH] IN BLOOD BY AUTOMATED COUNT: 14.9 % (ref 11.9–14.6)
GLUCOSE SERPL-MCNC: 128 MG/DL (ref 65–100)
HCT VFR BLD AUTO: 40.4 % (ref 41.1–50.3)
HGB BLD-MCNC: 13.9 G/DL (ref 13.6–17.2)
LYMPHOCYTES # BLD: 1.5 K/UL (ref 0.5–4.6)
LYMPHOCYTES NFR BLD MANUAL: 16 % (ref 16–44)
MCH RBC QN AUTO: 30 PG (ref 26.1–32.9)
MCHC RBC AUTO-ENTMCNC: 34.4 G/DL (ref 31.4–35)
MCV RBC AUTO: 87.3 FL (ref 79.6–97.8)
METAMYELOCYTES NFR BLD MANUAL: 2 %
MONOCYTES # BLD: 0.4 K/UL (ref 0.1–1.3)
MONOCYTES NFR BLD MANUAL: 4 % (ref 3–9)
MYELOCYTES NFR BLD MANUAL: 1 %
NEUTS SEG # BLD: 7 K/UL (ref 1.7–8.2)
NEUTS SEG NFR BLD MANUAL: 74 % (ref 47–75)
PLATELET # BLD AUTO: 238 K/UL (ref 150–450)
PLATELET COMMENTS,PCOM: ADEQUATE
PMV BLD AUTO: 9.3 FL (ref 10.8–14.1)
POTASSIUM SERPL-SCNC: 3.7 MMOL/L (ref 3.5–5.1)
RBC # BLD AUTO: 4.63 M/UL (ref 4.23–5.67)
RBC MORPH BLD: ABNORMAL
RBC MORPH BLD: ABNORMAL
SODIUM SERPL-SCNC: 144 MMOL/L (ref 136–145)
WBC # BLD AUTO: 9.2 K/UL (ref 4.3–11.1)

## 2017-05-15 PROCEDURE — 74011000258 HC RX REV CODE- 258: Performed by: SURGERY

## 2017-05-15 PROCEDURE — 74011250636 HC RX REV CODE- 250/636: Performed by: SURGERY

## 2017-05-15 PROCEDURE — 80048 BASIC METABOLIC PNL TOTAL CA: CPT | Performed by: SURGERY

## 2017-05-15 PROCEDURE — 74011250636 HC RX REV CODE- 250/636: Performed by: INTERNAL MEDICINE

## 2017-05-15 PROCEDURE — C9113 INJ PANTOPRAZOLE SODIUM, VIA: HCPCS | Performed by: SURGERY

## 2017-05-15 PROCEDURE — 36415 COLL VENOUS BLD VENIPUNCTURE: CPT | Performed by: SURGERY

## 2017-05-15 PROCEDURE — 74011000250 HC RX REV CODE- 250: Performed by: SURGERY

## 2017-05-15 PROCEDURE — 85025 COMPLETE CBC W/AUTO DIFF WBC: CPT | Performed by: SURGERY

## 2017-05-15 RX ORDER — OXYCODONE AND ACETAMINOPHEN 5; 325 MG/1; MG/1
1-2 TABLET ORAL
Qty: 40 TAB | Refills: 0 | Status: SHIPPED
Start: 2017-05-15

## 2017-05-15 RX ORDER — OXYCODONE AND ACETAMINOPHEN 5; 325 MG/1; MG/1
2 TABLET ORAL
Status: DISCONTINUED | OUTPATIENT
Start: 2017-05-15 | End: 2017-05-15 | Stop reason: HOSPADM

## 2017-05-15 RX ORDER — OXYCODONE AND ACETAMINOPHEN 5; 325 MG/1; MG/1
1 TABLET ORAL
Status: DISCONTINUED | OUTPATIENT
Start: 2017-05-15 | End: 2017-05-15 | Stop reason: HOSPADM

## 2017-05-15 RX ORDER — AMOXICILLIN AND CLAVULANATE POTASSIUM 875; 125 MG/1; MG/1
1 TABLET, FILM COATED ORAL EVERY 12 HOURS
Qty: 14 TAB | Refills: 0 | Status: SHIPPED
Start: 2017-05-15 | End: 2017-05-22

## 2017-05-15 RX ORDER — OXYCODONE AND ACETAMINOPHEN 5; 325 MG/1; MG/1
1-2 TABLET ORAL
Status: DISCONTINUED | OUTPATIENT
Start: 2017-05-15 | End: 2017-05-15 | Stop reason: HOSPADM

## 2017-05-15 RX ADMIN — VANCOMYCIN HYDROCHLORIDE 1750 MG: 10 INJECTION, POWDER, LYOPHILIZED, FOR SOLUTION INTRAVENOUS at 02:09

## 2017-05-15 RX ADMIN — ONDANSETRON 4 MG: 2 INJECTION INTRAMUSCULAR; INTRAVENOUS at 02:58

## 2017-05-15 RX ADMIN — HYDROMORPHONE HYDROCHLORIDE 1 MG: 1 INJECTION, SOLUTION INTRAMUSCULAR; INTRAVENOUS; SUBCUTANEOUS at 06:15

## 2017-05-15 RX ADMIN — PIPERACILLIN, TAZOBACTAM 4.5 G: 4; .5 INJECTION, POWDER, LYOPHILIZED, FOR SOLUTION INTRAVENOUS at 04:04

## 2017-05-15 RX ADMIN — ENOXAPARIN SODIUM 40 MG: 40 INJECTION SUBCUTANEOUS at 05:48

## 2017-05-15 RX ADMIN — METOCLOPRAMIDE 10 MG: 5 INJECTION, SOLUTION INTRAMUSCULAR; INTRAVENOUS at 05:48

## 2017-05-15 RX ADMIN — HYDROMORPHONE HYDROCHLORIDE 1 MG: 1 INJECTION, SOLUTION INTRAMUSCULAR; INTRAVENOUS; SUBCUTANEOUS at 01:13

## 2017-05-15 RX ADMIN — PIPERACILLIN, TAZOBACTAM 4.5 G: 4; .5 INJECTION, POWDER, LYOPHILIZED, FOR SOLUTION INTRAVENOUS at 11:36

## 2017-05-15 RX ADMIN — Medication 10 ML: at 05:49

## 2017-05-15 RX ADMIN — SODIUM CHLORIDE 40 MG: 9 INJECTION INTRAMUSCULAR; INTRAVENOUS; SUBCUTANEOUS at 11:30

## 2017-05-15 RX ADMIN — METOCLOPRAMIDE 10 MG: 5 INJECTION, SOLUTION INTRAMUSCULAR; INTRAVENOUS at 11:36

## 2017-05-15 NOTE — DISCHARGE INSTRUCTIONS
MD Instructions: Follow-up with Dr. Alonso Goltz as directed. Keep incision/staples clean and dry, may remain uncovered. Do not apply lotions, creams or ointments to incisions/barry. Farrukh Hood will be removed at follow-up appointment. Diet - as tolerated - Soft foods diet. Activity - ambulate - as tolerated - no heavy lifting >10lb. May shower - no tub baths or soaking/submerging. No driving while taking narcotics. Do not drink alcohol while taking narcotics. Resume other home medications. If problems or questions arise, please call our office at [phone]. DISCHARGE SUMMARY from Nurse    The following personal items are in your possession at time of discharge:    Dental Appliances: None        Home Medications: None  Jewelry: None  Clothing: Pants, Undergarments, With patient  Other Valuables: None             PATIENT INSTRUCTIONS:    After general anesthesia or intravenous sedation, for 24 hours or while taking prescription Narcotics:  · Limit your activities  · Do not drive and operate hazardous machinery  · Do not make important personal or business decisions  · Do  not drink alcoholic beverages  · If you have not urinated within 8 hours after discharge, please contact your surgeon on call. Report the following to your surgeon:  · Excessive pain, swelling, redness or odor of or around the surgical area  · Temperature over 100.5  · Nausea and vomiting lasting longer than 4 hours or if unable to take medications  · Any signs of decreased circulation or nerve impairment to extremity: change in color, persistent  numbness, tingling, coldness or increase pain  · Any questions        What to do at Home:  Recommended activity: Activity as tolerated,     If you experience any of the following symptoms , please follow up with . *  Please give a list of your current medications to your Primary Care Provider.     *  Please update this list whenever your medications are discontinued, doses are      changed, or new medications (including over-the-counter products) are added. *  Please carry medication information at all times in case of emergency situations. These are general instructions for a healthy lifestyle:    No smoking/ No tobacco products/ Avoid exposure to second hand smoke    Surgeon General's Warning:  Quitting smoking now greatly reduces serious risk to your health. Obesity, smoking, and sedentary lifestyle greatly increases your risk for illness    A healthy diet, regular physical exercise & weight monitoring are important for maintaining a healthy lifestyle    You may be retaining fluid if you have a history of heart failure or if you experience any of the following symptoms:  Weight gain of 3 pounds or more overnight or 5 pounds in a week, increased swelling in our hands or feet or shortness of breath while lying flat in bed. Please call your doctor as soon as you notice any of these symptoms; do not wait until your next office visit. Recognize signs and symptoms of STROKE:    F-face looks uneven    A-arms unable to move or move unevenly    S-speech slurred or non-existent    T-time-call 911 as soon as signs and symptoms begin-DO NOT go       Back to bed or wait to see if you get better-TIME IS BRAIN. Warning Signs of HEART ATTACK     Call 911 if you have these symptoms:   Chest discomfort. Most heart attacks involve discomfort in the center of the chest that lasts more than a few minutes, or that goes away and comes back. It can feel like uncomfortable pressure, squeezing, fullness, or pain.  Discomfort in other areas of the upper body. Symptoms can include pain or discomfort in one or both arms, the back, neck, jaw, or stomach.  Shortness of breath with or without chest discomfort.  Other signs may include breaking out in a cold sweat, nausea, or lightheadedness. Don't wait more than five minutes to call 911 - MINUTES MATTER!  Fast action can save your life. Calling 911 is almost always the fastest way to get lifesaving treatment. Emergency Medical Services staff can begin treatment when they arrive -- up to an hour sooner than if someone gets to the hospital by car. The discharge information has been reviewed with the patient. The patient verbalized understanding. Discharge medications reviewed with the patient and appropriate educational materials and side effects teaching were provided.

## 2017-05-15 NOTE — PROGRESS NOTES
General Surgery Progress Note    5/15/2017    Admit Date: 5/11/2017    Subjective:     Surgery POD #4  Passing flatus. Tolerated clear liquids. Creatinine normal. Vital signs stable. Objective:     Visit Vitals    BP (!) 140/92    Pulse 69    Temp 98.8 °F (37.1 °C)    Resp 18    Ht 5' 10\" (1.778 m)    Wt 295 lb 6.7 oz (134 kg)    SpO2 91%    BMI 42.39 kg/m2         Intake/Output Summary (Last 24 hours) at 05/15/17 0945  Last data filed at 05/15/17 0700   Gross per 24 hour   Intake             9414 ml   Output              310 ml   Net             9104 ml        EXAM:  ABD soft, mild incisional tenderness, active BS's. Data Review    Recent Results (from the past 24 hour(s))   VANCOMYCIN, TROUGH    Collection Time: 05/14/17 10:06 AM   Result Value Ref [place]    Vancomycin,trough 13.9 5 - 20 ug/mL   CBC WITH AUTOMATED DIFF    Collection Time: 05/15/17  4:25 AM   Result Value Ref [place]    WBC 9.2 4.3 - 11.1 K/uL    RBC 4.63 4.23 - 5.67 M/uL    HGB 13.9 13.6 - 17.2 g/dL    HCT 40.4 (L) 41.1 - 50.3 %    MCV 87.3 79.6 - 97.8 FL    MCH 30.0 26.1 - 32.9 PG    MCHC 34.4 31.4 - 35.0 g/dL    RDW 14.9 (H) 11.9 - 14.6 %    PLATELET 316 971 - 810 K/uL    MPV 9.3 (L) 10.8 - 14.1 FL    NEUTROPHILS 74 47 - 75 %    LYMPHOCYTES 16 16 - 44 %    MONOCYTES 4 3 - 9 %    EOSINOPHILS 3 1 - 8 %    METAMYELOCYTES 2 %    MYELOCYTES 1 %    ABS. NEUTROPHILS 7.0 1.7 - 8.2 K/UL    ABS. LYMPHOCYTES 1.5 0.5 - 4.6 K/UL    ABS. MONOCYTES 0.4 0.1 - 1.3 K/UL    ABS.  EOSINOPHILS 0.3 0.0 - 0.8 K/UL    RBC COMMENTS SLIGHT  ANISOCYTOSIS + POIKILOCYTOSIS        RBC COMMENTS NORMOCYTIC/NORMOCHROMIC      PLATELET COMMENTS ADEQUATE      DF MANUAL     METABOLIC PANEL, BASIC    Collection Time: 05/15/17  4:25 AM   Result Value Ref [place]    Sodium 144 136 - 145 mmol/L    Potassium 3.7 3.5 - 5.1 mmol/L    Chloride 107 98 - 107 mmol/L    CO2 27 21 - 32 mmol/L    Anion gap 10 7 - 16 mmol/L    Glucose 128 (H) 65 - 100 mg/dL    BUN 11 6 - 23 MG/DL Creatinine 0.83 0.8 - 1.5 MG/DL    GFR est AA >60 >60 ml/min/1.73m2    GFR est non-AA >60 >60 ml/min/1.73m2    Calcium 8.0 (L) 8.3 - 10.4 MG/DL        Hospital Problems  Date Reviewed: 5/11/2017          Codes Class Noted POA    * (Principal)Generalized abdominal pain ICD-10-CM: R10.84  ICD-9-CM: 789.07  5/11/2017 Yes        Intractable cyclical vomiting with nausea ICD-10-CM: G43. A1  ICD-9-CM: 536.2  5/11/2017 Yes        SBO (small bowel obstruction) ([facility] [address] ICD-10-CM: K56.69  ICD-9-CM: 560.9  5/11/2017 Yes        Musculoskeletal disorder ICD-10-CM: M79.9  ICD-9-CM: 729.90  Unknown Yes        Morbid obesity ([facility] [address] (Chronic) ICD-10-CM: E66.01  ICD-9-CM: 278.01  Unknown Yes        Contact dermatitis and other eczema, due to unspecified cause ICD-10-CM: L25.9  ICD-9-CM: 692.9  Unknown Yes        PAM on CPAP (Chronic) ICD-10-CM: G47.33, Z99.89  ICD-9-CM: 327.23, V46.8  5/11/2017 Yes        Small bowel perforation ([facility] ICD-10-CM: K63.1  ICD-9-CM: 569.83  5/11/2017 Unknown          1. Soft diet  2. Discharge to home later today.   3. F/U with me on 5/23/17  Mercy Carrillo MD.

## 2017-05-15 NOTE — PROGRESS NOTES
Pt reported he did not really feel like eating lunch meal (GI soft)  I instructed to take what he felt he could tolerate but do not force.   Pt drank tea but did not eat any solid food at lunch

## 2017-05-15 NOTE — DISCHARGE SUMMARY
[address]  [phone]     Discharge Summary     Evonne Durham  MRN: [id number]     : 1965     Age: 47 y.o. Admit date: 2017     Discharge date: 5/15/17    Attending Physician: Dr. Maude Herrmann MD, FACS  Primary Discharge Diagnosis:   Principal Problem:    Generalized abdominal pain (2017)    Active Problems:    Intractable cyclical vomiting with nausea (2017)      SBO (small bowel obstruction) ([facility] [address] (2017)      Musculoskeletal disorder ()      Morbid obesity ([facility] ()      Contact dermatitis and other eczema, due to unspecified cause ()      PAM on CPAP (2017)      Small bowel perforation ([facility] [address] (2017)      Primary Operations or Procedures:  Procedure(s):  LAPAROTOMY EXPLORATORY /SMALL BOWEL RESECTION     Brief History: Evonne Durham was admitted with the following history of present illness. Evonne Durham is a 47 y.o. male who is s/p lap band removal by Dr Geovanna Shen on 17. The patient reports it was originally placed in approx  in [place], [place] and it wasn't working (no net weight loss; BMI 42.9) and was thought to possibly be giving him some lower chest pain after cardiac workup was unremarkable. UGI showed resistance to passage of a barium tablet and reflux with esophageal spasm. He reports he went home the day of surgery and did well initially. He reports he had a lot of problems with progressive nausea and dry heaves and this became accompanied by increasing abd pain. He went to the ER at [facility] where /88, no fever, WBC 10.9, BUN/Cr 25/1.29 and non fever. He had CT scan was performed which had indeterminate findings of some small bowel wall thickening, an area of punctate air and some mesenteric gas without portal vein gas or pneumatosis.  The ER called and asked that he be transferred to [facility] as the surgeons there were not comfortable caring for him there. He was transferred to [facility] ICU. He dernies CP or SOB. He denies abd pain at rest.        Hospital Course:  He was admitted to the ICU and was taken to the OR on 5/11/17 for Exploratory laparotomy, Lysis of adhesions and Small bowel resection with primary anastomosis due to Small bowel injury with a small-bowel obstruction. He was returned to the ICU post-operatively for routine management. He was extubated on POD1. He was moved out to the floor later that afternoon. He has progressed well. Pain has been controlled. He has tolerated diet progression to GI soft. Bowel function has returned and he has been having BMs. He was found to have some incisional erythema that has been treated with antibiotics. Drains have been removed. He is ambulating independently in the halls. He is felt stable for discharge home today. Discharge Medications:   Current Discharge Medication List      START taking these medications    Details   oxyCODONE-acetaminophen (PERCOCET) 5-325 mg per tablet Take 1-2 Tabs by mouth every four (4) hours as needed. Max Daily Amount: 12 Tabs. Qty: 40 Tab, Refills: 0      amoxicillin-clavulanate (AUGMENTIN) 875-125 mg per tablet Take 1 Tab by mouth every twelve (12) hours for 7 days. Qty: 14 Tab, Refills: 0         CONTINUE these medications which have NOT CHANGED    Details   fluticasone (FLONASE) 50 mcg/actuation nasal spray 2 Sprays by Both Nostrils route nightly. Indications: ALLERGIC RHINITIS               Discharge Instructions/Follow-up Plans:      MD Instructions: Follow-up with Dr. Isa Black as directed. Keep incision/staples clean and dry, may remain uncovered. Do not apply lotions, creams or ointments to incisions/staples. Clabe Beat will be removed at follow-up appointment. Diet - as tolerated - Soft foods diet. Activity - ambulate - as tolerated - no heavy lifting >10lb. May shower - no tub baths or soaking/submerging. No driving while taking narcotics.   Do not drink alcohol while taking narcotics. Resume other home medications. If problems or questions arise, please call our office at [phone].     Greater than 30 minutes were spent discharging the patient      Signed:  MELVA Middleton   5/15/2017  4:19 PM

## 2017-05-15 NOTE — PROGRESS NOTES
END OF SHIFT NOTE:    INTAKE/OUTPUT  05/14 0701 - 05/15 0700  In: 1852 [I.V.:1852]  Out: 020 [Urine:675]  Voiding: YES  Catheter: NO  Drain:              Flatus: Patient does have flatus present. Stool:  1 occurrences. Characteristics:  Stool Assessment  Stool Color: Green  Stool Appearance: Loose  Stool Amount: Medium  Stool Source/Status: Rectum    Emesis: 0 occurrences. Characteristics:        VITAL SIGNS  Patient Vitals for the past 12 hrs:   Temp Pulse Resp BP SpO2   05/15/17 0310 98.4 °F (36.9 °C) 73 18 148/82 94 %   05/14/17 2300 98.6 °F (37 °C) (!) 54 18 138/78 90 %   05/14/17 1923 98.8 °F (37.1 °C) 77 18 154/85 90 %       Pain Assessment  Pain Intensity 1: 5 (05/15/17 0615)  Pain Location 1: Abdomen  Pain Intervention(s) 1: Medication (see MAR)  Patient Stated Pain Goal: 0    Ambulating  Yes    Shift report given to oncoming nurse at the bedside.     Tamera Guzman RN

## 2017-05-16 LAB
BACTERIA SPEC CULT: NORMAL
BACTERIA SPEC CULT: NORMAL
SERVICE CMNT-IMP: NORMAL
SERVICE CMNT-IMP: NORMAL

## 2024-03-28 NOTE — PROGRESS NOTES
Patient with CML c/b acute blast crisis. Transferred from BMT service for AMS.    - hydrea 3500 BID per BMT  - S/p BMB on 3/27.  Follow up results and BMT recommendations  - continue acyclovir, allopurinol, fluconazole  - broad spec abx  - GOC discussions   Spoke to Mr. Osmar Greene (from [place], [place]; works at [employer]) and his sister Ms. Finn Gonzalez (her cell is [phone]; from [de-identified] [place], [place]) in room 201 about discharge planning. His plan is to go home with his sister in [place]de-identified] [place], [place], then home later to [place], [place]. Also spoke to MsSharonda Massey in [employer] in [place], [place] at [phone] (per her request) about his [facility]. Confirmed policy number and plan name with Mr. Osmar Greene [facility] face sheet is correct). Mr. Osmar Greene said that he paid for his recent bariatric surgery at [facility] as [facility] would not approve it. His admission diagnosis here on May 11, 2017 is \"Bariatric Post Op Mesenteric Abd Pain. \"

## (undated) DEVICE — SINGLE BASIN: Brand: CARDINAL HEALTH

## (undated) DEVICE — CURVED, LARGE JAW, OPEN SEALER/DIVIDER NANO-COATED: Brand: LIGASURE IMPACT

## (undated) DEVICE — SUTURE SZ 0 27IN 5/8 CIR UR-6  TAPER PT VIOLET ABSRB VICRYL J603H

## (undated) DEVICE — CYSTO/BLADDER IRRIGATION SET, REGULATING CLAMP

## (undated) DEVICE — (D)STRIP SKN CLSR 0.5X4IN WHT --

## (undated) DEVICE — LAP CHOLE: Brand: MEDLINE INDUSTRIES, INC.

## (undated) DEVICE — [HIGH FLOW INSUFFLATOR,  DO NOT USE IF PACKAGE IS DAMAGED,  KEEP DRY,  KEEP AWAY FROM SUNLIGHT,  PROTECT FROM HEAT AND RADIOACTIVE SOURCES.]: Brand: PNEUMOSURE

## (undated) DEVICE — DRAPE,TOP,102X53,STERILE: Brand: MEDLINE

## (undated) DEVICE — SUTURE PERMAHAND SZ 2-0 L12X18IN NONABSORBABLE BLK SILK A185H

## (undated) DEVICE — SLIM BODY SKIN STAPLER: Brand: APPOSE ULC

## (undated) DEVICE — INTENDED FOR TISSUE SEPARATION, AND OTHER PROCEDURES THAT REQUIRE A SHARP SURGICAL BLADE TO PUNCTURE OR CUT.: Brand: BARD-PARKER ® STAINLESS STEEL BLADES

## (undated) DEVICE — SUTURE MCRYL SZ 4-0 L27IN ABSRB UD L19MM PS-2 1/2 CIR PRIM Y426H

## (undated) DEVICE — BLADELESS OPTICAL TROCAR WITH FIXATION CANNULA: Brand: VERSAONE

## (undated) DEVICE — SUT ETHBND 0 30IN SH GRN --

## (undated) DEVICE — SOLUTION IV 1000ML 0.9% SOD CHL

## (undated) DEVICE — MEDI-VAC NON-CONDUCTIVE SUCTION TUBING: Brand: CARDINAL HEALTH

## (undated) DEVICE — SUTURE VCRL SZ 3-0 L27IN ABSRB UD L26MM SH 1/2 CIR J416H

## (undated) DEVICE — AMD ANTIMICROBIAL GAUZE SPONGES,12 PLY USP TYPE VII, 0.2% POLYHEXAMETHYLENE BIGUANIDE HCI (PHMB): Brand: CURITY

## (undated) DEVICE — SUT SLK 2-0SH 30IN BLK --

## (undated) DEVICE — SOL ANTI-FOG 6ML MEDC -- MEDICHOICE - CONVERT TO 358427

## (undated) DEVICE — SOL IRR SOD CL 0.9% 3000ML --

## (undated) DEVICE — LOADING UNIT WITH DST SERIES TECHNOLOGY: Brand: GIA

## (undated) DEVICE — DERMABOND SKIN ADH 0.7ML -- DERMABOND ADVANCED 12/BX

## (undated) DEVICE — 2000CC GUARDIAN II: Brand: GUARDIAN

## (undated) DEVICE — MASTISOL ADHESIVE LIQ 2/3ML

## (undated) DEVICE — SHEET, T, LAPAROTOMY, STERILE: Brand: MEDLINE

## (undated) DEVICE — LOGICUT SCISSOR LENGTH 320MM: Brand: LOGI - LAPAROSCOPIC INSTRUMENT SYSTEM

## (undated) DEVICE — DUAL LUMEN STOMACH TUBE: Brand: SALEM SUMP

## (undated) DEVICE — NEEDLE HYPO 21GA L1.5IN INTRAMUSCULAR S STL LATCH BVL UP

## (undated) DEVICE — BLADELESS OPTICAL TROCAR WITH FIXATION CANNULA: Brand: VERSAPORT

## (undated) DEVICE — MEDI-VAC YANKAUER SUCTION HANDLE W/BULBOUS TIP: Brand: CARDINAL HEALTH

## (undated) DEVICE — REM POLYHESIVE ADULT PATIENT RETURN ELECTRODE: Brand: VALLEYLAB

## (undated) DEVICE — SPONGE LAP 18X18IN STRL -- 5/PK

## (undated) DEVICE — SUT SLK 3-0 30IN SH BLK --

## (undated) DEVICE — KENDALL SCD EXPRESS SLEEVES, KNEE LENGTH, LARGE: Brand: KENDALL SCD

## (undated) DEVICE — (D)PREP SKN CHLRAPRP APPL 26ML -- CONVERT TO ITEM 371833

## (undated) DEVICE — SUTURE PERMAHAND SZ 3-0 L30IN NONABSORBABLE BLK L26MM SH C017D

## (undated) DEVICE — UNIVERSAL FIXATION CANNULA: Brand: VERSAONE

## (undated) DEVICE — SUTURE PDS II SZ 1 L96IN ABSRB VLT TP-1 L65MM 1/2 CIR Z880G

## (undated) DEVICE — STAPLER WITH DST SERIES TECHNOLOGY: Brand: GIA

## (undated) DEVICE — BLADE ELECTRODE: Brand: EDGE

## (undated) DEVICE — SHEAR HARMONIC 5MMX45CM -- ACE 7+

## (undated) DEVICE — TRAY CATH 16F DRN BG LTX -- CONVERT TO ITEM 363158

## (undated) DEVICE — SURGICAL PROCEDURE PACK BASIC ST FRANCIS